# Patient Record
Sex: MALE | Race: WHITE | NOT HISPANIC OR LATINO | Employment: OTHER | URBAN - METROPOLITAN AREA
[De-identification: names, ages, dates, MRNs, and addresses within clinical notes are randomized per-mention and may not be internally consistent; named-entity substitution may affect disease eponyms.]

---

## 2018-05-31 ENCOUNTER — RECORDS - HEALTHEAST (OUTPATIENT)
Dept: LAB | Facility: CLINIC | Age: 68
End: 2018-05-31

## 2018-05-31 LAB
ALBUMIN SERPL-MCNC: 3.9 G/DL (ref 3.5–5)
ALP SERPL-CCNC: 52 U/L (ref 45–120)
ALT SERPL W P-5'-P-CCNC: 29 U/L (ref 0–45)
ANION GAP SERPL CALCULATED.3IONS-SCNC: 8 MMOL/L (ref 5–18)
AST SERPL W P-5'-P-CCNC: 25 U/L (ref 0–40)
BILIRUB SERPL-MCNC: 0.6 MG/DL (ref 0–1)
BUN SERPL-MCNC: 16 MG/DL (ref 8–22)
CALCIUM SERPL-MCNC: 9.3 MG/DL (ref 8.5–10.5)
CHLORIDE BLD-SCNC: 106 MMOL/L (ref 98–107)
CHOLEST SERPL-MCNC: 153 MG/DL
CO2 SERPL-SCNC: 27 MMOL/L (ref 22–31)
CREAT SERPL-MCNC: 0.77 MG/DL (ref 0.7–1.3)
FASTING STATUS PATIENT QL REPORTED: YES
GFR SERPL CREATININE-BSD FRML MDRD: >60 ML/MIN/1.73M2
GLUCOSE BLD-MCNC: 107 MG/DL (ref 70–125)
HDLC SERPL-MCNC: 45 MG/DL
LDLC SERPL CALC-MCNC: 97 MG/DL
POTASSIUM BLD-SCNC: 4.6 MMOL/L (ref 3.5–5)
PROT SERPL-MCNC: 6.4 G/DL (ref 6–8)
SODIUM SERPL-SCNC: 141 MMOL/L (ref 136–145)
TRIGL SERPL-MCNC: 57 MG/DL

## 2018-10-02 ENCOUNTER — RECORDS - HEALTHEAST (OUTPATIENT)
Dept: LAB | Facility: CLINIC | Age: 68
End: 2018-10-02

## 2018-10-03 LAB — B BURGDOR IGG+IGM SER QL: 0.09 INDEX VALUE

## 2020-02-25 ENCOUNTER — RECORDS - HEALTHEAST (OUTPATIENT)
Dept: LAB | Facility: CLINIC | Age: 70
End: 2020-02-25

## 2020-02-25 ENCOUNTER — RECORDS - HEALTHEAST (OUTPATIENT)
Dept: ADMINISTRATIVE | Facility: OTHER | Age: 70
End: 2020-02-25

## 2020-02-25 LAB
ALBUMIN SERPL-MCNC: 3.8 G/DL (ref 3.5–5)
ALP SERPL-CCNC: 60 U/L (ref 45–120)
ALT SERPL W P-5'-P-CCNC: 49 U/L (ref 0–45)
ANION GAP SERPL CALCULATED.3IONS-SCNC: 10 MMOL/L (ref 5–18)
AST SERPL W P-5'-P-CCNC: 26 U/L (ref 0–40)
BILIRUB SERPL-MCNC: 0.3 MG/DL (ref 0–1)
BUN SERPL-MCNC: 17 MG/DL (ref 8–28)
CALCIUM SERPL-MCNC: 9.2 MG/DL (ref 8.5–10.5)
CHLORIDE BLD-SCNC: 107 MMOL/L (ref 98–107)
CO2 SERPL-SCNC: 24 MMOL/L (ref 22–31)
CREAT SERPL-MCNC: 0.72 MG/DL (ref 0.7–1.3)
GFR SERPL CREATININE-BSD FRML MDRD: >60 ML/MIN/1.73M2
GLUCOSE BLD-MCNC: 96 MG/DL (ref 70–125)
POTASSIUM BLD-SCNC: 4.5 MMOL/L (ref 3.5–5)
PROT SERPL-MCNC: 6.5 G/DL (ref 6–8)
SODIUM SERPL-SCNC: 141 MMOL/L (ref 136–145)
TSH SERPL DL<=0.005 MIU/L-ACNC: 1.39 UIU/ML (ref 0.3–5)

## 2020-08-03 ENCOUNTER — RECORDS - HEALTHEAST (OUTPATIENT)
Dept: LAB | Facility: CLINIC | Age: 70
End: 2020-08-03

## 2020-08-04 LAB — B BURGDOR IGG+IGM SER QL: 0.06 INDEX VALUE

## 2021-01-07 ENCOUNTER — RECORDS - HEALTHEAST (OUTPATIENT)
Dept: LAB | Facility: CLINIC | Age: 71
End: 2021-01-07

## 2021-01-08 LAB
CHOLEST SERPL-MCNC: 163 MG/DL
FASTING STATUS PATIENT QL REPORTED: YES
HDLC SERPL-MCNC: 52 MG/DL
LDLC SERPL CALC-MCNC: 94 MG/DL
TRIGL SERPL-MCNC: 83 MG/DL

## 2021-01-25 ENCOUNTER — RECORDS - HEALTHEAST (OUTPATIENT)
Dept: LAB | Facility: CLINIC | Age: 71
End: 2021-01-25

## 2021-01-25 LAB
ALBUMIN SERPL-MCNC: 3.9 G/DL (ref 3.5–5)
ALP SERPL-CCNC: 52 U/L (ref 45–120)
ALT SERPL W P-5'-P-CCNC: 27 U/L (ref 0–45)
ANION GAP SERPL CALCULATED.3IONS-SCNC: 7 MMOL/L (ref 5–18)
AST SERPL W P-5'-P-CCNC: 24 U/L (ref 0–40)
BILIRUB SERPL-MCNC: 0.4 MG/DL (ref 0–1)
BUN SERPL-MCNC: 13 MG/DL (ref 8–28)
CALCIUM SERPL-MCNC: 9.1 MG/DL (ref 8.5–10.5)
CHLORIDE BLD-SCNC: 104 MMOL/L (ref 98–107)
CHOLEST SERPL-MCNC: 210 MG/DL
CO2 SERPL-SCNC: 27 MMOL/L (ref 22–31)
CREAT SERPL-MCNC: 0.73 MG/DL (ref 0.7–1.3)
FASTING STATUS PATIENT QL REPORTED: YES
GFR SERPL CREATININE-BSD FRML MDRD: >60 ML/MIN/1.73M2
GLUCOSE BLD-MCNC: 101 MG/DL (ref 70–125)
HDLC SERPL-MCNC: 44 MG/DL
LDLC SERPL CALC-MCNC: 145 MG/DL
POTASSIUM BLD-SCNC: 5 MMOL/L (ref 3.5–5)
PROT SERPL-MCNC: 6.3 G/DL (ref 6–8)
PSA SERPL-MCNC: 0.6 NG/ML (ref 0–6.5)
SODIUM SERPL-SCNC: 138 MMOL/L (ref 136–145)
TRIGL SERPL-MCNC: 107 MG/DL

## 2021-04-08 ENCOUNTER — RECORDS - HEALTHEAST (OUTPATIENT)
Dept: LAB | Facility: CLINIC | Age: 71
End: 2021-04-08

## 2021-04-08 LAB
ALBUMIN SERPL-MCNC: 4 G/DL (ref 3.5–5)
ALP SERPL-CCNC: 50 U/L (ref 45–120)
ALT SERPL W P-5'-P-CCNC: 29 U/L (ref 0–45)
AST SERPL W P-5'-P-CCNC: 24 U/L (ref 0–40)
BILIRUB DIRECT SERPL-MCNC: 0.2 MG/DL
BILIRUB SERPL-MCNC: 0.5 MG/DL (ref 0–1)
CHOLEST SERPL-MCNC: 146 MG/DL
FASTING STATUS PATIENT QL REPORTED: YES
HDLC SERPL-MCNC: 43 MG/DL
LDLC SERPL CALC-MCNC: 87 MG/DL
PROT SERPL-MCNC: 6.1 G/DL (ref 6–8)
TRIGL SERPL-MCNC: 80 MG/DL

## 2021-05-25 ENCOUNTER — RECORDS - HEALTHEAST (OUTPATIENT)
Dept: ADMINISTRATIVE | Facility: CLINIC | Age: 71
End: 2021-05-25

## 2021-05-27 ENCOUNTER — RECORDS - HEALTHEAST (OUTPATIENT)
Dept: ADMINISTRATIVE | Facility: CLINIC | Age: 71
End: 2021-05-27

## 2021-06-02 ENCOUNTER — RECORDS - HEALTHEAST (OUTPATIENT)
Dept: ADMINISTRATIVE | Facility: CLINIC | Age: 71
End: 2021-06-02

## 2021-06-05 ENCOUNTER — RECORDS - HEALTHEAST (OUTPATIENT)
Dept: NEUROLOGY | Facility: CLINIC | Age: 71
End: 2021-06-05

## 2021-06-05 DIAGNOSIS — G43.909 MIGRAINE: ICD-10-CM

## 2021-06-05 DIAGNOSIS — G35 MULTIPLE SCLEROSIS (H): ICD-10-CM

## 2021-11-05 ENCOUNTER — LAB REQUISITION (OUTPATIENT)
Dept: LAB | Facility: CLINIC | Age: 71
End: 2021-11-05

## 2021-11-05 DIAGNOSIS — E78.5 HYPERLIPIDEMIA, UNSPECIFIED: ICD-10-CM

## 2021-11-12 ENCOUNTER — LAB REQUISITION (OUTPATIENT)
Dept: LAB | Facility: CLINIC | Age: 71
End: 2021-11-12
Payer: COMMERCIAL

## 2021-11-12 DIAGNOSIS — E78.5 HYPERLIPIDEMIA, UNSPECIFIED: ICD-10-CM

## 2021-11-12 LAB
ALBUMIN SERPL-MCNC: 3.9 G/DL (ref 3.5–5)
ALP SERPL-CCNC: 58 U/L (ref 45–120)
ALT SERPL W P-5'-P-CCNC: 22 U/L (ref 0–45)
ANION GAP SERPL CALCULATED.3IONS-SCNC: 8 MMOL/L (ref 5–18)
AST SERPL W P-5'-P-CCNC: 19 U/L (ref 0–40)
BILIRUB SERPL-MCNC: 0.5 MG/DL (ref 0–1)
BUN SERPL-MCNC: 14 MG/DL (ref 8–28)
CALCIUM SERPL-MCNC: 9.1 MG/DL (ref 8.5–10.5)
CHLORIDE BLD-SCNC: 105 MMOL/L (ref 98–107)
CHOLEST SERPL-MCNC: 156 MG/DL
CO2 SERPL-SCNC: 26 MMOL/L (ref 22–31)
CREAT SERPL-MCNC: 0.75 MG/DL (ref 0.7–1.3)
FASTING STATUS PATIENT QL REPORTED: NORMAL
GFR SERPL CREATININE-BSD FRML MDRD: >90 ML/MIN/1.73M2
GLUCOSE BLD-MCNC: 103 MG/DL (ref 70–125)
HDLC SERPL-MCNC: 45 MG/DL
LDLC SERPL CALC-MCNC: 97 MG/DL
POTASSIUM BLD-SCNC: 4.2 MMOL/L (ref 3.5–5)
PROT SERPL-MCNC: 6.5 G/DL (ref 6–8)
SODIUM SERPL-SCNC: 139 MMOL/L (ref 136–145)
TRIGL SERPL-MCNC: 69 MG/DL

## 2021-11-12 PROCEDURE — 80061 LIPID PANEL: CPT | Mod: ORL | Performed by: FAMILY MEDICINE

## 2021-11-12 PROCEDURE — 80053 COMPREHEN METABOLIC PANEL: CPT | Mod: ORL | Performed by: FAMILY MEDICINE

## 2022-08-03 ENCOUNTER — TRANSFERRED RECORDS (OUTPATIENT)
Dept: HEALTH INFORMATION MANAGEMENT | Facility: CLINIC | Age: 72
End: 2022-08-03

## 2022-08-03 ENCOUNTER — LAB REQUISITION (OUTPATIENT)
Dept: LAB | Facility: CLINIC | Age: 72
End: 2022-08-03
Payer: COMMERCIAL

## 2022-08-03 DIAGNOSIS — R00.2 PALPITATIONS: ICD-10-CM

## 2022-08-03 LAB
ALBUMIN SERPL BCG-MCNC: 4.5 G/DL (ref 3.5–5.2)
ALP SERPL-CCNC: 59 U/L (ref 40–129)
ALT SERPL W P-5'-P-CCNC: 27 U/L (ref 10–50)
ANION GAP SERPL CALCULATED.3IONS-SCNC: 10 MMOL/L (ref 7–15)
AST SERPL W P-5'-P-CCNC: 24 U/L (ref 10–50)
BILIRUB SERPL-MCNC: 0.3 MG/DL
BUN SERPL-MCNC: 15.9 MG/DL (ref 8–23)
CALCIUM SERPL-MCNC: 9.4 MG/DL (ref 8.8–10.2)
CHLORIDE SERPL-SCNC: 103 MMOL/L (ref 98–107)
CREAT SERPL-MCNC: 0.74 MG/DL (ref 0.67–1.17)
DEPRECATED HCO3 PLAS-SCNC: 27 MMOL/L (ref 22–29)
GFR SERPL CREATININE-BSD FRML MDRD: >90 ML/MIN/1.73M2
GLUCOSE SERPL-MCNC: 107 MG/DL (ref 70–99)
POTASSIUM SERPL-SCNC: 4.4 MMOL/L (ref 3.4–5.3)
PROT SERPL-MCNC: 6.6 G/DL (ref 6.4–8.3)
SODIUM SERPL-SCNC: 140 MMOL/L (ref 136–145)
TSH SERPL DL<=0.005 MIU/L-ACNC: 2.1 UIU/ML (ref 0.3–4.2)

## 2022-08-03 PROCEDURE — 84443 ASSAY THYROID STIM HORMONE: CPT | Mod: ORL | Performed by: FAMILY MEDICINE

## 2022-08-03 PROCEDURE — 80053 COMPREHEN METABOLIC PANEL: CPT | Mod: ORL | Performed by: FAMILY MEDICINE

## 2022-08-11 ENCOUNTER — HOSPITAL ENCOUNTER (OUTPATIENT)
Dept: CARDIOLOGY | Facility: CLINIC | Age: 72
Discharge: HOME | End: 2022-08-11
Attending: FAMILY MEDICINE
Payer: COMMERCIAL

## 2022-08-11 DIAGNOSIS — R00.2 HEART PALPITATIONS: ICD-10-CM

## 2022-08-11 PROCEDURE — 93016 CV STRESS TEST SUPVJ ONLY: CPT | Performed by: INTERNAL MEDICINE

## 2022-08-11 PROCEDURE — 255N000002 HC RX 255 OP 636: Performed by: FAMILY MEDICINE

## 2022-08-11 PROCEDURE — 93325 DOPPLER ECHO COLOR FLOW MAPG: CPT | Mod: 26 | Performed by: INTERNAL MEDICINE

## 2022-08-11 PROCEDURE — 93352 ADMIN ECG CONTRAST AGENT: CPT | Performed by: INTERNAL MEDICINE

## 2022-08-11 PROCEDURE — 93226 XTRNL ECG REC<48 HR SCAN A/R: CPT

## 2022-08-11 PROCEDURE — 93321 DOPPLER ECHO F-UP/LMTD STD: CPT | Mod: TC

## 2022-08-11 PROCEDURE — C8928 TTE W OR W/O FOL W/CON,STRES: HCPCS

## 2022-08-11 PROCEDURE — 93018 CV STRESS TEST I&R ONLY: CPT | Performed by: INTERNAL MEDICINE

## 2022-08-11 PROCEDURE — 93325 DOPPLER ECHO COLOR FLOW MAPG: CPT | Mod: TC

## 2022-08-11 PROCEDURE — 93321 DOPPLER ECHO F-UP/LMTD STD: CPT | Mod: 26 | Performed by: INTERNAL MEDICINE

## 2022-08-11 PROCEDURE — 93350 STRESS TTE ONLY: CPT | Mod: 26 | Performed by: INTERNAL MEDICINE

## 2022-08-11 RX ADMIN — PERFLUTREN 5 ML: 6.52 INJECTION, SUSPENSION INTRAVENOUS at 13:45

## 2022-08-15 PROCEDURE — 93227 XTRNL ECG REC<48 HR R&I: CPT | Performed by: INTERNAL MEDICINE

## 2022-08-18 ENCOUNTER — MEDICAL CORRESPONDENCE (OUTPATIENT)
Dept: HEALTH INFORMATION MANAGEMENT | Facility: CLINIC | Age: 72
End: 2022-08-18

## 2022-08-31 ENCOUNTER — TELEPHONE (OUTPATIENT)
Dept: CARDIOLOGY | Facility: CLINIC | Age: 72
End: 2022-08-31

## 2022-08-31 ENCOUNTER — OFFICE VISIT (OUTPATIENT)
Dept: CARDIOLOGY | Facility: CLINIC | Age: 72
End: 2022-08-31
Payer: COMMERCIAL

## 2022-08-31 VITALS
DIASTOLIC BLOOD PRESSURE: 68 MMHG | RESPIRATION RATE: 16 BRPM | WEIGHT: 195 LBS | HEART RATE: 68 BPM | SYSTOLIC BLOOD PRESSURE: 90 MMHG | BODY MASS INDEX: 27.98 KG/M2

## 2022-08-31 DIAGNOSIS — I49.3 PVC'S (PREMATURE VENTRICULAR CONTRACTIONS): ICD-10-CM

## 2022-08-31 DIAGNOSIS — R00.2 HEART PALPITATIONS: ICD-10-CM

## 2022-08-31 DIAGNOSIS — R94.39 ABNORMAL CARDIOVASCULAR STRESS TEST: Primary | ICD-10-CM

## 2022-08-31 DIAGNOSIS — I25.10 CORONARY ARTERY DISEASE INVOLVING NATIVE CORONARY ARTERY OF NATIVE HEART WITHOUT ANGINA PECTORIS: Primary | ICD-10-CM

## 2022-08-31 DIAGNOSIS — R94.39 ABNORMAL CARDIOVASCULAR STRESS TEST: ICD-10-CM

## 2022-08-31 PROCEDURE — 99205 OFFICE O/P NEW HI 60 MIN: CPT | Performed by: INTERNAL MEDICINE

## 2022-08-31 RX ORDER — RIZATRIPTAN BENZOATE 10 MG/1
10 TABLET ORAL
COMMUNITY
End: 2024-06-19

## 2022-08-31 RX ORDER — ALBUTEROL SULFATE 90 UG/1
2 AEROSOL, METERED RESPIRATORY (INHALATION) EVERY 6 HOURS PRN
COMMUNITY
End: 2024-03-04

## 2022-08-31 NOTE — H&P (VIEW-ONLY)
[facility]  [phone]        Assessment/Recommendations   Patient with recent Holter monitor which was ordered because of recurrent palpitations.  He had 8% premature ventricular contractions on his Holter monitor but no ventricular dysrhythmias.  Stress echo showed new hypokinesis in the mid and anterior apical region of the left ventricle postexercise and the EKG showed ST changes suggestive of myocardial ischemia.  Patient has no symptoms of chest discomfort and does admit to being a bit more short of breath but relates that to some deconditioning as he has not been exercising.    We talked about strategies going forward and given his high level of PVCs, a history of 50% LAD stenosis many years ago on CT angiography and abnormal stress echocardiogram, I have recommended coronary angiography with the possibility of percutaneous intervention.  It is possible that this could reduce PVC burden and reduce his risk of PVC induced cardiomyopathy but this is not a certainty at all.  It is also possible that his shortness of breath and exercise tolerance could improve but again not a certainty.    Patient is comfortable going ahead with coronary angiography and I explained potential risks including stroke, myocardial infarction or death.    We will schedule in the near future.    His lipids have been treated with our atorvastatin for many years.  Would add a baby aspirin if patient not allergic.  60 minutes spent with chart review, patient visit, and documentation.     History of Present Illness/Subjective    Mr. Alex Jiang is a 72 year old male with known history of coronary artery disease.  In approximately 1995 he had a calcium score which is 4-600.  He recollects having a CT coronary angiogram shortly thereafter which showed a 50% LAD stenosis.  At that time he started on our atorvastatin high-dose but eventually weaned it down to 20 mg a day and reports that his LDL generally was right  around 100 or little last.  His HDL tended to run a little low but would stay around 40.  He has had episodes of palpitations in the past and a Holter monitor couple years ago was unremarkable.  He had a repeat Holter monitor because of recurrent palpitations and he had 8% PVCs, no runs of ventricular tachycardia, no atrial fibrillation.  Because of the frequent PVCs he had a stress echocardiogram which showed anterior apical ischemia with new wall motion normality or lack of improvement of this area post exercise.    Patient denies orthopnea, paroxysmal nocturnal dyspnea, peripheral edema, syncope or near syncopal episodes.  He has no history rheumatic fever, cerebrovascular accident or TIA he is not diabetic, has never smoked cigarettes, cholesterol as noted above.  Family history is not dramatic for premature coronary artery disease and has not been treated for hypertension.    Patient grew up in [facility].  He went to the [employer] medical [employer].  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  Patient is .    .       Physical Examination Review of Systems   BP 90/68 (BP Location: Left arm, Patient Position: Sitting, Cuff Size: Adult Regular)   Pulse 68   Resp 16   Wt 88.5 kg (195 lb)   BMI 27.98 kg/m    Body mass index is 27.98 kg/m .  Wt Readings from Last 3 Encounters:   08/31/22 88.5 kg (195 lb)     General Appearance:   Alert, cooperative and in no acute distress.   ENT/Mouth: Patient wearing a mask.      EYES:  no scleral icterus, normal conjunctivae   Neck: JVP normal. No Hepatojugular reflux. Thyroid not visualized.   Chest/Lungs:   Lungs are clear to auscultation, equal chest wall expansion.   Cardiovascular:   S1, S2 without murmur ,clicks or rubs.  Brachial, radial and posterior tibial pulses are intact and symetric. No carotid bruits noted   Abdomen:  Nontender.    Extremities: No cyanosis, clubbing or edema   Skin: no xanthelasma, warm.    Neurologic: normal arm movement bilateral, no tremors     Psychiatric: Appropriate affect.      Enc Vitals  BP: 90/68  Pulse: 68  Resp: 16  Weight: 88.5 kg (195 lb)                                           Medical History  Surgical History Family History Social History   No past medical history on file. No past surgical history on file. No family history on file. Social History     Socioeconomic History     Marital status: Single     Spouse name: Not on file     Number of children: Not on file     Years of education: Not on file     Highest education level: Not on file   Occupational History     Not on file   Tobacco Use     Smoking status: Former Smoker     Quit date:      Years since quittin.6     Smokeless tobacco: Never Used   Substance and Sexual Activity     Alcohol use: Not on file     Drug use: Not on file     Sexual activity: Not on file   Other Topics Concern     Not on file   Social History Narrative     Not on file     Social Determinants of Health     Financial Resource Strain: Not on file   Food Insecurity: Not on file   Transportation Needs: Not on file   Physical Activity: Not on file   Stress: Not on file   Social Connections: Not on file   Intimate Partner Violence: Not on file   Housing Stability: Not on file          Medications  Allergies   Current Outpatient Medications   Medication Sig Dispense Refill     albuterol (PROAIR HFA/PROVENTIL HFA/VENTOLIN HFA) 108 (90 Base) MCG/ACT inhaler Inhale 2 puffs into the lungs every 6 hours       loratadine (CLARITIN) 10 mg tablet [LORATADINE (CLARITIN) 10 MG TABLET] Take 10 mg by mouth daily.       lovastatin (MEVACOR) 20 MG tablet [LOVASTATIN (MEVACOR) 20 MG TABLET] Take 20 mg by mouth bedtime.       rizatriptan (MAXALT) 10 MG tablet Take 10 mg by  mouth at onset of headache for migraine       therapeutic multivitamin (THERAGRAN) tablet [THERAPEUTIC MULTIVITAMIN (THERAGRAN) TABLET] Take 1 tablet by mouth daily.      Allergies   Allergen Reactions     Cephalosporins Unknown     Penicillins Unknown     Sulfa (Sulfonamide Antibiotics) [Sulfa Drugs] Unknown         Lab Results    Chemistry/lipid CBC Cardiac Enzymes/BNP/TSH/INR   Lab Results   Component Value Date    CHOL 156 11/12/2021    HDL 45 11/12/2021    TRIG 69 11/12/2021    BUN 15.9 08/03/2022     08/03/2022    CO2 27 08/03/2022    No results found for: WBC, HGB, HCT, MCV, PLT Lab Results   Component Value Date    TSH 2.10 08/03/2022

## 2022-08-31 NOTE — PROGRESS NOTES
[facility]  [phone]        Assessment/Recommendations   Patient with recent Holter monitor which was ordered because of recurrent palpitations.  He had 8% premature ventricular contractions on his Holter monitor but no ventricular dysrhythmias.  Stress echo showed new hypokinesis in the mid and anterior apical region of the left ventricle postexercise and the EKG showed ST changes suggestive of myocardial ischemia.  Patient has no symptoms of chest discomfort and does admit to being a bit more short of breath but relates that to some deconditioning as he has not been exercising.    We talked about strategies going forward and given his high level of PVCs, a history of 50% LAD stenosis many years ago on CT angiography and abnormal stress echocardiogram, I have recommended coronary angiography with the possibility of percutaneous intervention.  It is possible that this could reduce PVC burden and reduce his risk of PVC induced cardiomyopathy but this is not a certainty at all.  It is also possible that his shortness of breath and exercise tolerance could improve but again not a certainty.    Patient is comfortable going ahead with coronary angiography and I explained potential risks including stroke, myocardial infarction or death.    We will schedule in the near future.    His lipids have been treated with our atorvastatin for many years.  Would add a baby aspirin if patient not allergic.  60 minutes spent with chart review, patient visit, and documentation.     History of Present Illness/Subjective    Mr. Alex Jiang is a 72 year old male with known history of coronary artery disease.  In approximately 1995 he had a calcium score which is 4-600.  He recollects having a CT coronary angiogram shortly thereafter which showed a 50% LAD stenosis.  At that time he started on our atorvastatin high-dose but eventually weaned it down to 20 mg a day and reports that his LDL generally was right  around 100 or little last.  His HDL tended to run a little low but would stay around 40.  He has had episodes of palpitations in the past and a Holter monitor couple years ago was unremarkable.  He had a repeat Holter monitor because of recurrent palpitations and he had 8% PVCs, no runs of ventricular tachycardia, no atrial fibrillation.  Because of the frequent PVCs he had a stress echocardiogram which showed anterior apical ischemia with new wall motion normality or lack of improvement of this area post exercise.    Patient denies orthopnea, paroxysmal nocturnal dyspnea, peripheral edema, syncope or near syncopal episodes.  He has no history rheumatic fever, cerebrovascular accident or TIA he is not diabetic, has never smoked cigarettes, cholesterol as noted above.  Family history is not dramatic for premature coronary artery disease and has not been treated for hypertension.    Patient grew up in [place] [place] [facility].  He went to the [employer] medical school.  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  Patient is .    .       Physical Examination Review of Systems   BP 90/68 (BP Location: Left arm, Patient Position: Sitting, Cuff Size: Adult Regular)   Pulse 68   Resp 16   Wt 88.5 kg (195 lb)   BMI 27.98 kg/m    Body mass index is 27.98 kg/m .  Wt Readings from Last 3 Encounters:   08/31/22 88.5 kg (195 lb)     General Appearance:   Alert, cooperative and in no acute distress.   ENT/Mouth: Patient wearing a mask.      EYES:  no scleral icterus, normal conjunctivae   Neck: JVP normal. No Hepatojugular reflux. Thyroid not visualized.   Chest/Lungs:   Lungs are clear to auscultation, equal chest wall expansion.   Cardiovascular:   S1, S2 without murmur ,clicks or rubs.  Brachial, radial and posterior tibial pulses are intact and symetric. No carotid bruits noted   Abdomen:  Nontender.    Extremities: No cyanosis, clubbing or edema   Skin: no xanthelasma, warm.    Neurologic: normal arm movement bilateral, no tremors     Psychiatric: Appropriate affect.      Enc Vitals  BP: 90/68  Pulse: 68  Resp: 16  Weight: 88.5 kg (195 lb)                                           Medical History  Surgical History Family History Social History   No past medical history on file. No past surgical history on file. No family history on file. Social History     Socioeconomic History     Marital status: Single     Spouse name: Not on file     Number of children: Not on file     Years of education: Not on file     Highest education level: Not on file   Occupational History     Not on file   Tobacco Use     Smoking status: Former Smoker     Quit date:      Years since quittin.6     Smokeless tobacco: Never Used   Substance and Sexual Activity     Alcohol use: Not on file     Drug use: Not on file     Sexual activity: Not on file   Other Topics Concern     Not on file   Social History Narrative     Not on file     Social Determinants of Health     Financial Resource Strain: Not on file   Food Insecurity: Not on file   Transportation Needs: Not on file   Physical Activity: Not on file   Stress: Not on file   Social Connections: Not on file   Intimate Partner Violence: Not on file   Housing Stability: Not on file          Medications  Allergies   Current Outpatient Medications   Medication Sig Dispense Refill     albuterol (PROAIR HFA/PROVENTIL HFA/VENTOLIN HFA) 108 (90 Base) MCG/ACT inhaler Inhale 2 puffs into the lungs every 6 hours       loratadine (CLARITIN) 10 mg tablet [LORATADINE (CLARITIN) 10 MG TABLET] Take 10 mg by mouth daily.       lovastatin (MEVACOR) 20 MG tablet [LOVASTATIN (MEVACOR) 20 MG TABLET] Take 20 mg by mouth bedtime.       rizatriptan (MAXALT) 10 MG tablet Take 10 mg by  mouth at onset of headache for migraine       therapeutic multivitamin (THERAGRAN) tablet [THERAPEUTIC MULTIVITAMIN (THERAGRAN) TABLET] Take 1 tablet by mouth daily.      Allergies   Allergen Reactions     Cephalosporins Unknown     Penicillins Unknown     Sulfa (Sulfonamide Antibiotics) [Sulfa Drugs] Unknown         Lab Results    Chemistry/lipid CBC Cardiac Enzymes/BNP/TSH/INR   Lab Results   Component Value Date    CHOL 156 11/12/2021    HDL 45 11/12/2021    TRIG 69 11/12/2021    BUN 15.9 08/03/2022     08/03/2022    CO2 27 08/03/2022    No results found for: WBC, HGB, HCT, MCV, PLT Lab Results   Component Value Date    TSH 2.10 08/03/2022

## 2022-08-31 NOTE — LETTER
8/31/2022    Richar Flor MD  [facility] [address] [place] [address]    RE: Alex Jiang       Dear Colleague,     I had the pleasure of seeing Alex Jiang in the [facility].      [facility]  [phone]        Assessment/Recommendations   Patient with recent Holter monitor which was ordered because of recurrent palpitations.  He had 8% premature ventricular contractions on his Holter monitor but no ventricular dysrhythmias.  Stress echo showed new hypokinesis in the mid and anterior apical region of the left ventricle postexercise and the EKG showed ST changes suggestive of myocardial ischemia.  Patient has no symptoms of chest discomfort and does admit to being a bit more short of breath but relates that to some deconditioning as he has not been exercising.    We talked about strategies going forward and given his high level of PVCs, a history of 50% LAD stenosis many years ago on CT angiography and abnormal stress echocardiogram, I have recommended coronary angiography with the possibility of percutaneous intervention.  It is possible that this could reduce PVC burden and reduce his risk of PVC induced cardiomyopathy but this is not a certainty at all.  It is also possible that his shortness of breath and exercise tolerance could improve but again not a certainty.    Patient is comfortable going ahead with coronary angiography and I explained potential risks including stroke, myocardial infarction or death.    We will schedule in the near future.    His lipids have been treated with our atorvastatin for many years.  Would add a baby aspirin if patient not allergic.  60 minutes spent with chart review, patient visit, and documentation.     History of Present Illness/Subjective    Mr. Alex Jiang is a 72 year old male with known history of coronary artery disease.  In approximately 1995 he had a calcium score which is 4-600.  He recollects having a CT  coronary angiogram shortly thereafter which showed a 50% LAD stenosis.  At that time he started on our atorvastatin high-dose but eventually weaned it down to 20 mg a day and reports that his LDL generally was right around 100 or little last.  His HDL tended to run a little low but would stay around 40.  He has had episodes of palpitations in the past and a Holter monitor couple years ago was unremarkable.  He had a repeat Holter monitor because of recurrent palpitations and he had 8% PVCs, no runs of ventricular tachycardia, no atrial fibrillation.  Because of the frequent PVCs he had a stress echocardiogram which showed anterior apical ischemia with new wall motion normality or lack of improvement of this area post exercise.    Patient denies orthopnea, paroxysmal nocturnal dyspnea, peripheral edema, syncope or near syncopal episodes.  He has no history rheumatic fever, cerebrovascular accident or TIA he is not diabetic, has never smoked cigarettes, cholesterol as noted above.  Family history is not dramatic for premature coronary artery disease and has not been treated for hypertension.    Patient grew up in [facility].  He went to the [employer] medical [employer].  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  Patient is .    .       Physical Examination Review of Systems   BP 90/68 (BP Location: Left arm, Patient Position: Sitting, Cuff Size: Adult Regular)   Pulse 68   Resp 16   Wt 88.5 kg (195 lb)   BMI 27.98 kg/m    Body mass index is 27.98 kg/m .  Wt Readings from Last 3 Encounters:   08/31/22 88.5 kg (195 lb)     General Appearance:   Alert, cooperative and in no acute distress.   ENT/Mouth: Patient wearing a mask.      EYES:  no scleral icterus, normal  conjunctivae   Neck: JVP normal. No Hepatojugular reflux. Thyroid not visualized.   Chest/Lungs:   Lungs are clear to auscultation, equal chest wall expansion.   Cardiovascular:   S1, S2 without murmur ,clicks or rubs. Brachial, radial and posterior tibial pulses are intact and symetric. No carotid bruits noted   Abdomen:  Nontender.    Extremities: No cyanosis, clubbing or edema   Skin: no xanthelasma, warm.    Neurologic: normal arm movement bilateral, no tremors     Psychiatric: Appropriate affect.      Enc Vitals  BP: 90/68  Pulse: 68  Resp: 16  Weight: 88.5 kg (195 lb)                                           Medical History  Surgical History Family History Social History   No past medical history on file. No past surgical history on file. No family history on file. Social History     Socioeconomic History     Marital status: Single     Spouse name: Not on file     Number of children: Not on file     Years of education: Not on file     Highest education level: Not on file   Occupational History     Not on file   Tobacco Use     Smoking status: Former Smoker     Quit date:      Years since quittin.6     Smokeless tobacco: Never Used   Substance and Sexual Activity     Alcohol use: Not on file     Drug use: Not on file     Sexual activity: Not on file   Other Topics Concern     Not on file   Social History Narrative     Not on file     Social Determinants of Health     Financial Resource Strain: Not on file   Food Insecurity: Not on file   Transportation Needs: Not on file   Physical Activity: Not on file   Stress: Not on file   Social Connections: Not on file   Intimate Partner Violence: Not on file   Housing Stability: Not on file          Medications  Allergies   Current Outpatient Medications   Medication Sig Dispense Refill     albuterol (PROAIR HFA/PROVENTIL HFA/VENTOLIN HFA) 108 (90 Base) MCG/ACT inhaler Inhale 2 puffs into the lungs every 6 hours       loratadine (CLARITIN) 10 mg tablet  [LORATADINE (CLARITIN) 10 MG TABLET] Take 10 mg by mouth daily.       lovastatin (MEVACOR) 20 MG tablet [LOVASTATIN (MEVACOR) 20 MG TABLET] Take 20 mg by mouth bedtime.       rizatriptan (MAXALT) 10 MG tablet Take 10 mg by mouth at onset of headache for migraine       therapeutic multivitamin (THERAGRAN) tablet [THERAPEUTIC MULTIVITAMIN (THERAGRAN) TABLET] Take 1 tablet by mouth daily.      Allergies   Allergen Reactions     Cephalosporins Unknown     Penicillins Unknown     Sulfa (Sulfonamide Antibiotics) [Sulfa Drugs] Unknown         Lab Results    Chemistry/lipid CBC Cardiac Enzymes/BNP/TSH/INR   Lab Results   Component Value Date    CHOL 156 11/12/2021    HDL 45 11/12/2021    TRIG 69 11/12/2021    BUN 15.9 08/03/2022     08/03/2022    CO2 27 08/03/2022    No results found for: WBC, HGB, HCT, MCV, PLT Lab Results   Component Value Date    TSH 2.10 08/03/2022                Thank you for allowing me to participate in the care of your patient.      Sincerely,     Booker Leon MD     [facility]  cc:   Referred Self,

## 2022-09-01 NOTE — TELEPHONE ENCOUNTER
Verbal discussion with J and regards to Maxalt for migraine treatment prior to procedure. THJ states after review to not take any Maxalt morning of procedure as it does have vasoconstriction properties. Once patient arranged will follow-up discuss. TarikRn

## 2022-09-02 NOTE — TELEPHONE ENCOUNTER
===View-only below this line===  ----- Message -----  From: Bj Zuluaga  Sent: 9/2/2022  10:40 AM CDT  To: Johan Watson RN  Subject: RE: [facility] patient to schedule for coronary ang*    Case type: CA POSS PCI    Procedure Physician(s): PTK    Procedure Date and Patient Arrival Time: 9/7 9:30AM ARRIVAL    H&P: 8/31 THJ    Alerts: NONE     COVID Testing: AT HOME    THANK YOU,  CRISELDA      Pt has prep letter. Will call to complete education on 9/6/22. ANDREASRn

## 2022-09-06 ENCOUNTER — PREP FOR PROCEDURE (OUTPATIENT)
Dept: CARDIOLOGY | Facility: CLINIC | Age: 72
End: 2022-09-06

## 2022-09-06 DIAGNOSIS — I49.3 PVC'S (PREMATURE VENTRICULAR CONTRACTIONS): ICD-10-CM

## 2022-09-06 DIAGNOSIS — I25.10 CORONARY ARTERY DISEASE INVOLVING NATIVE CORONARY ARTERY OF NATIVE HEART WITHOUT ANGINA PECTORIS: ICD-10-CM

## 2022-09-06 DIAGNOSIS — R94.39 ABNORMAL CARDIOVASCULAR STRESS TEST: ICD-10-CM

## 2022-09-06 DIAGNOSIS — R00.2 HEART PALPITATIONS: Primary | ICD-10-CM

## 2022-09-06 RX ORDER — SODIUM CHLORIDE 9 MG/ML
INJECTION, SOLUTION INTRAVENOUS CONTINUOUS
Status: CANCELLED | OUTPATIENT
Start: 2022-09-07

## 2022-09-06 RX ORDER — FENTANYL CITRATE 50 UG/ML
25 INJECTION, SOLUTION INTRAMUSCULAR; INTRAVENOUS
Status: CANCELLED | OUTPATIENT
Start: 2022-09-07

## 2022-09-06 RX ORDER — ASPIRIN 81 MG/1
243 TABLET, CHEWABLE ORAL ONCE
Status: CANCELLED | OUTPATIENT
Start: 2022-09-07

## 2022-09-06 RX ORDER — LIDOCAINE 40 MG/G
CREAM TOPICAL
Status: CANCELLED | OUTPATIENT
Start: 2022-09-06

## 2022-09-06 RX ORDER — ASPIRIN 325 MG
325 TABLET ORAL ONCE
Status: CANCELLED | OUTPATIENT
Start: 2022-09-07 | End: 2022-09-06

## 2022-09-06 RX ORDER — LOVASTATIN 20 MG
20 TABLET ORAL AT BEDTIME
Qty: 90 TABLET | Refills: 3 | Status: SHIPPED | OUTPATIENT
Start: 2022-09-06 | End: 2022-09-06

## 2022-09-06 RX ORDER — DIAZEPAM 5 MG
5 TABLET ORAL
Status: CANCELLED | OUTPATIENT
Start: 2022-09-07

## 2022-09-06 NOTE — TELEPHONE ENCOUNTER
Alex Jiang  [address] [facility] [address]  [phone] (home)     PCP:  Richar Flor  H&VALERIE completed by:  8/31/2022 Dr. Leon  Admit date 9/7/2022 Arrival time:  09:30  Anticoagulation:  NA  Previous PCI: No  Bypass Grafts: No  Renal Issues: No  Diabetic?: No  Device?: No   Ambulation status: independent    Reason for Visit:  Telephone call to discuss pre-procedure education in preparation for: Coronary Angiogram with Possible PCI    Procedure Prep:  EKG results obtained, dated: To be completed on day of cath lab procedure  Hemogram results obtained: To be completed on day of cath lab procedure  Basic Metabolic Panel results obtained: To be completed on day of cath lab procedure  Pertinent cardiac test results: Stress Echo 8/11/2022, Holter Monitor 8/11/2022  COVID-19 test results obtained: At home     Patient Education    1. Your arrival time is 09:30.  [place] [address] - Main Entrance of the Hospital  2. Please plan on being at the hospital all day.  3. At any time, emergencies and/or urgent cases may come up which could delay the start of your procedure.    COVID Testing Instructions  *Mandatory COVID Testing:   ALL Patients will need to complete a COVID test no sooner than 4 days prior to their procedure (regardless of vaccination status).      To schedule COVID testing Please call [phone]    If you want to complete this at an outside facility please call them to find out if they will have the results within the appropriate time frame and their fax number.  You will need to provide us with that information so we can send the order.    The facility completing the test will need to fax the results to [phone]    If you are running into and issues please call us.     Pre-procedure instructions  Take your temperature in the morning prior to coming in.  If your temperature is 100 F please call  [facility] [phone] and  notify them.  If you do not have access to a thermometer at home, please come in for testing.  If you are running a temperature your procedure may be rescheduled.  Patient instructed to not Eat or Drink after midnight.  Patient instructed to shower the evening before or the morning of the procedure.  Patient instructed to arrange for transportation home following procedure from a responsible family member or friend. No driving for at least 24 hours.  Patient instructed to have a responsible adult with them for 24 hours post-procedure.  Post-procedure follow up process.  Conscious sedation discussed.      Pre-procedure medication instructions.  Continue medications as scheduled, with a small amount of water on the day of the procedure unless indicated. (NO Diabetic Medications or Blood Thinners)  Pt instructed not to consume Alcohol, Tobacco, Caffeine, or Carbonated beverages 12 hours prior to procedure.  Patient instructed to take 325 mg of Aspirin the night before and morning of procedure: Yes  Other medication: instructed to only take gabapentin, loratadine a.m. of the procedure.    Patient's wife will be the  the day of  The procedure and will stay with him for 24 hours after.             Patient states understanding of procedure and agrees to proceed.    *PATIENTS RECORDS AVAILABLE IN [facility] [facility] UNLESS OTHERWISE INDICATED*      There is no problem list on file for this patient.      Current Outpatient Medications   Medication Sig Dispense Refill     albuterol (PROAIR HFA/PROVENTIL HFA/VENTOLIN HFA) 108 (90 Base) MCG/ACT inhaler Inhale 2 puffs into the lungs every 6 hours       loratadine (CLARITIN) 10 mg tablet [LORATADINE (CLARITIN) 10 MG TABLET] Take 10 mg by mouth daily.       lovastatin (MEVACOR) 20 MG tablet [LOVASTATIN (MEVACOR) 20 MG TABLET] Take 20 mg by mouth bedtime.       rizatriptan (MAXALT) 10 MG tablet Take 10 mg by mouth at onset of headache for migraine       therapeutic multivitamin (THERAGRAN)  tablet [THERAPEUTIC MULTIVITAMIN (THERAGRAN) TABLET] Take 1 tablet by mouth daily.         Allergies   Allergen Reactions     Cephalosporins Unknown     Penicillins Unknown     Sulfa (Sulfonamide Antibiotics) [Sulfa Drugs] Unknown       JO ANN ABBOTT RN on 9/6/2022 at 11:15 AM

## 2022-09-07 ENCOUNTER — RESEARCH ENCOUNTER (OUTPATIENT)
Dept: CARDIOLOGY | Facility: CLINIC | Age: 72
End: 2022-09-07

## 2022-09-07 ENCOUNTER — HOSPITAL ENCOUNTER (OUTPATIENT)
Facility: HOSPITAL | Age: 72
Discharge: HOME | End: 2022-09-07
Attending: INTERNAL MEDICINE | Admitting: INTERNAL MEDICINE
Payer: COMMERCIAL

## 2022-09-07 VITALS
DIASTOLIC BLOOD PRESSURE: 60 MMHG | HEIGHT: 70 IN | HEART RATE: 74 BPM | WEIGHT: 189 LBS | SYSTOLIC BLOOD PRESSURE: 95 MMHG | TEMPERATURE: 97.6 F | BODY MASS INDEX: 27.06 KG/M2 | RESPIRATION RATE: 21 BRPM | OXYGEN SATURATION: 97 %

## 2022-09-07 DIAGNOSIS — R94.39 ABNORMAL CARDIOVASCULAR STRESS TEST: ICD-10-CM

## 2022-09-07 DIAGNOSIS — I49.3 PVC'S (PREMATURE VENTRICULAR CONTRACTIONS): ICD-10-CM

## 2022-09-07 DIAGNOSIS — I25.10 CORONARY ARTERY DISEASE INVOLVING NATIVE CORONARY ARTERY OF NATIVE HEART WITHOUT ANGINA PECTORIS: ICD-10-CM

## 2022-09-07 DIAGNOSIS — R00.2 HEART PALPITATIONS: ICD-10-CM

## 2022-09-07 LAB
ABO/RH(D): NORMAL
ANION GAP SERPL CALCULATED.3IONS-SCNC: 8 MMOL/L (ref 5–18)
ANTIBODY SCREEN: NEGATIVE
ATRIAL RATE - MUSE: 64 BPM
BUN SERPL-MCNC: 17 MG/DL (ref 8–28)
CALCIUM SERPL-MCNC: 9 MG/DL (ref 8.5–10.5)
CHLORIDE BLD-SCNC: 105 MMOL/L (ref 98–107)
CO2 SERPL-SCNC: 25 MMOL/L (ref 22–31)
CREAT SERPL-MCNC: 0.78 MG/DL (ref 0.7–1.3)
DIASTOLIC BLOOD PRESSURE - MUSE: NORMAL MMHG
ERYTHROCYTE [DISTWIDTH] IN BLOOD BY AUTOMATED COUNT: 12.8 % (ref 10–15)
GFR SERPL CREATININE-BSD FRML MDRD: >90 ML/MIN/1.73M2
GLUCOSE BLD-MCNC: 103 MG/DL (ref 70–125)
HCT VFR BLD AUTO: 48.2 % (ref 40–53)
HGB BLD-MCNC: 16.2 G/DL (ref 13.3–17.7)
INTERPRETATION ECG - MUSE: NORMAL
MCH RBC QN AUTO: 30.7 PG (ref 26.5–33)
MCHC RBC AUTO-ENTMCNC: 33.6 G/DL (ref 31.5–36.5)
MCV RBC AUTO: 91 FL (ref 78–100)
P AXIS - MUSE: 42 DEGREES
PLATELET # BLD AUTO: 224 10E3/UL (ref 150–450)
POTASSIUM BLD-SCNC: 3.9 MMOL/L (ref 3.5–5)
PR INTERVAL - MUSE: 212 MS
QRS DURATION - MUSE: 90 MS
QT - MUSE: 398 MS
QTC - MUSE: 410 MS
R AXIS - MUSE: 27 DEGREES
RBC # BLD AUTO: 5.28 10E6/UL (ref 4.4–5.9)
SODIUM SERPL-SCNC: 138 MMOL/L (ref 136–145)
SPECIMEN EXPIRATION DATE: NORMAL
SYSTOLIC BLOOD PRESSURE - MUSE: NORMAL MMHG
T AXIS - MUSE: 30 DEGREES
VENTRICULAR RATE- MUSE: 64 BPM
WBC # BLD AUTO: 6.4 10E3/UL (ref 4–11)

## 2022-09-07 PROCEDURE — C1725 CATH, TRANSLUMIN NON-LASER: HCPCS | Performed by: INTERNAL MEDICINE

## 2022-09-07 PROCEDURE — 250N000013 HC RX MED GY IP 250 OP 250 PS 637: Performed by: INTERNAL MEDICINE

## 2022-09-07 PROCEDURE — 250N000009 HC RX 250: Performed by: INTERNAL MEDICINE

## 2022-09-07 PROCEDURE — 99152 MOD SED SAME PHYS/QHP 5/>YRS: CPT | Performed by: INTERNAL MEDICINE

## 2022-09-07 PROCEDURE — 86901 BLOOD TYPING SEROLOGIC RH(D): CPT | Performed by: INTERNAL MEDICINE

## 2022-09-07 PROCEDURE — 85014 HEMATOCRIT: CPT | Performed by: INTERNAL MEDICINE

## 2022-09-07 PROCEDURE — 36415 COLL VENOUS BLD VENIPUNCTURE: CPT | Performed by: INTERNAL MEDICINE

## 2022-09-07 PROCEDURE — 999N000054 HC STATISTIC EKG NON-CHARGEABLE

## 2022-09-07 PROCEDURE — 80048 BASIC METABOLIC PNL TOTAL CA: CPT | Performed by: INTERNAL MEDICINE

## 2022-09-07 PROCEDURE — 258N000003 HC RX IP 258 OP 636: Performed by: INTERNAL MEDICINE

## 2022-09-07 PROCEDURE — 93454 CORONARY ARTERY ANGIO S&I: CPT | Performed by: INTERNAL MEDICINE

## 2022-09-07 PROCEDURE — 250N000011 HC RX IP 250 OP 636: Performed by: INTERNAL MEDICINE

## 2022-09-07 PROCEDURE — 93005 ELECTROCARDIOGRAM TRACING: CPT

## 2022-09-07 PROCEDURE — 93454 CORONARY ARTERY ANGIO S&I: CPT | Mod: 26 | Performed by: INTERNAL MEDICINE

## 2022-09-07 PROCEDURE — 93010 ELECTROCARDIOGRAM REPORT: CPT | Performed by: INTERNAL MEDICINE

## 2022-09-07 PROCEDURE — C1769 GUIDE WIRE: HCPCS | Performed by: INTERNAL MEDICINE

## 2022-09-07 PROCEDURE — 86850 RBC ANTIBODY SCREEN: CPT | Performed by: INTERNAL MEDICINE

## 2022-09-07 PROCEDURE — 255N000002 HC RX 255 OP 636: Performed by: INTERNAL MEDICINE

## 2022-09-07 PROCEDURE — C1894 INTRO/SHEATH, NON-LASER: HCPCS | Performed by: INTERNAL MEDICINE

## 2022-09-07 PROCEDURE — 272N000001 HC OR GENERAL SUPPLY STERILE: Performed by: INTERNAL MEDICINE

## 2022-09-07 RX ORDER — FENTANYL CITRATE 50 UG/ML
INJECTION, SOLUTION INTRAMUSCULAR; INTRAVENOUS
Status: DISCONTINUED | OUTPATIENT
Start: 2022-09-07 | End: 2022-09-07 | Stop reason: HOSPADM

## 2022-09-07 RX ORDER — NALOXONE HYDROCHLORIDE 0.4 MG/ML
0.2 INJECTION, SOLUTION INTRAMUSCULAR; INTRAVENOUS; SUBCUTANEOUS
Status: DISCONTINUED | OUTPATIENT
Start: 2022-09-07 | End: 2022-09-07 | Stop reason: HOSPADM

## 2022-09-07 RX ORDER — ATROPINE SULFATE 0.1 MG/ML
0.5 INJECTION INTRAVENOUS
Status: DISCONTINUED | OUTPATIENT
Start: 2022-09-07 | End: 2022-09-07 | Stop reason: HOSPADM

## 2022-09-07 RX ORDER — FENTANYL CITRATE 50 UG/ML
25 INJECTION, SOLUTION INTRAMUSCULAR; INTRAVENOUS
Status: DISCONTINUED | OUTPATIENT
Start: 2022-09-07 | End: 2022-09-07 | Stop reason: HOSPADM

## 2022-09-07 RX ORDER — NALOXONE HYDROCHLORIDE 0.4 MG/ML
0.4 INJECTION, SOLUTION INTRAMUSCULAR; INTRAVENOUS; SUBCUTANEOUS
Status: DISCONTINUED | OUTPATIENT
Start: 2022-09-07 | End: 2022-09-07 | Stop reason: HOSPADM

## 2022-09-07 RX ORDER — SODIUM CHLORIDE 9 MG/ML
INJECTION, SOLUTION INTRAVENOUS CONTINUOUS
Status: DISCONTINUED | OUTPATIENT
Start: 2022-09-07 | End: 2022-09-07 | Stop reason: HOSPADM

## 2022-09-07 RX ORDER — LIDOCAINE 40 MG/G
CREAM TOPICAL
Status: DISCONTINUED | OUTPATIENT
Start: 2022-09-07 | End: 2022-09-07 | Stop reason: HOSPADM

## 2022-09-07 RX ORDER — IODIXANOL 320 MG/ML
INJECTION, SOLUTION INTRAVASCULAR
Status: DISCONTINUED | OUTPATIENT
Start: 2022-09-07 | End: 2022-09-07 | Stop reason: HOSPADM

## 2022-09-07 RX ORDER — DIAZEPAM 5 MG
5 TABLET ORAL
Status: COMPLETED | OUTPATIENT
Start: 2022-09-07 | End: 2022-09-07

## 2022-09-07 RX ORDER — ASPIRIN 81 MG/1
243 TABLET, CHEWABLE ORAL ONCE
Status: DISCONTINUED | OUTPATIENT
Start: 2022-09-07 | End: 2022-09-07 | Stop reason: HOSPADM

## 2022-09-07 RX ORDER — OXYCODONE HYDROCHLORIDE 5 MG/1
5 TABLET ORAL EVERY 4 HOURS PRN
Status: DISCONTINUED | OUTPATIENT
Start: 2022-09-07 | End: 2022-09-07 | Stop reason: HOSPADM

## 2022-09-07 RX ORDER — OXYCODONE HYDROCHLORIDE 5 MG/1
10 TABLET ORAL EVERY 4 HOURS PRN
Status: DISCONTINUED | OUTPATIENT
Start: 2022-09-07 | End: 2022-09-07 | Stop reason: HOSPADM

## 2022-09-07 RX ORDER — ACETAMINOPHEN 325 MG/1
650 TABLET ORAL EVERY 4 HOURS PRN
Status: DISCONTINUED | OUTPATIENT
Start: 2022-09-07 | End: 2022-09-07 | Stop reason: HOSPADM

## 2022-09-07 RX ORDER — FLUMAZENIL 0.1 MG/ML
0.2 INJECTION, SOLUTION INTRAVENOUS
Status: DISCONTINUED | OUTPATIENT
Start: 2022-09-07 | End: 2022-09-07 | Stop reason: HOSPADM

## 2022-09-07 RX ORDER — ASPIRIN 325 MG
325 TABLET ORAL ONCE
Status: DISCONTINUED | OUTPATIENT
Start: 2022-09-07 | End: 2022-09-07 | Stop reason: HOSPADM

## 2022-09-07 RX ADMIN — SODIUM CHLORIDE: 9 INJECTION, SOLUTION INTRAVENOUS at 10:11

## 2022-09-07 RX ADMIN — DIAZEPAM 5 MG: 5 TABLET ORAL at 10:24

## 2022-09-07 ASSESSMENT — ACTIVITIES OF DAILY LIVING (ADL)
ADLS_ACUITY_SCORE: 35
ADLS_ACUITY_SCORE: 35

## 2022-09-07 NOTE — INTERVAL H&P NOTE
"I have reviewed the surgical (or preoperative) H&P that is linked to this encounter, and examined the patient. There are no significant changes    Clinical Conditions Present on Arrival:  Clinically Significant Risk Factors Present on Admission                   # Overweight: Estimated body mass index is 27.12 kg/m  as calculated from the following:    Height as of this encounter: 1.778 m (5' 10\").    Weight as of this encounter: 85.7 kg (189 lb).       "

## 2022-09-07 NOTE — PROGRESS NOTES
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 The consent form was reviewed with the patient.    The consent discussion included:    Study description and purpose     Qualifications for participation    Study procedures    Length of study    Device description     Risks of participation    Benefits (if any)    Alternatives    Voluntary participation    Confidentiality     Compensation/costs of participation    Injury and legal rights    The subject was provided time to review the consent form and consider participation. his questions were answered to his satisfaction. The patient has voluntarily agreed to participate in the above noted study.     The consent form version [id number] and HIPPA form version [id number] was signed 09/07/22.    The subject was provided with a copy of the consent form and HIPPA.    Janet Melgar

## 2022-09-07 NOTE — PRE-PROCEDURE
GENERAL PRE-PROCEDURE:   Procedure:  Coronary angiogram with possible PCI  Date/Time:  9/7/2022 9:44 AM    Written consent obtained?: Yes    Risks and benefits: Risks, benefits and alternatives were discussed    Consent given by:  Patient  Patient states understanding of procedure being performed: Yes    Patient's understanding of procedure matches consent: Yes    Procedure consent matches procedure scheduled: Yes    Expected level of sedation:  Moderate  Appropriately NPO:  Yes  ASA Class:  3 (palpitations, PVCs, CAD on CT)  Mallampati  :  Grade 2- soft palate, base of uvula, tonsillar pillars, and portion of posterior pharyngeal wall visible  Lungs:  Lungs clear with good breath sounds bilaterally  Heart:  Normal heart sounds and rate  History & Physical reviewed:  History and physical reviewed and no updates needed  Statement of review:  I have reviewed the lab findings, diagnostic data, medications, and the plan for sedation

## 2022-09-07 NOTE — PROGRESS NOTES
Discharge Summary - [facility]    Primary Care Physician:  Richar Flor    Discharge Provider: Santosh Dougherty MD     Admission Date: 2022.   Admission Diagnoses: No past medical history on file.  Discharge Date and Time: No discharge date for patient encounter.   Disposition: Home  Condition at Discharge: Stable  Code Status: No Order      Discharge Diagnoses:  CAD    Consult/s: None  Significant Diagnostic Studies: Angiogram: see report.  Surgery: None  Treatments: See medication discharge sheet.    Discharge Medications: See discharge med list    Follow up appointment with Primary Care Physician: Richar Flor  Follow up appointment with Specialist: PRISCILLA Leon MD    Diet: cardiac  Activity: Restricted as per instructions on sheath site care policy.  Restrictions: As per post heart cath protocol.  Wound / drain care: Routine wound care instructions.  Hospital Summary:   Outpatient angiogram to assess abnormal stress study.  Right radial access without complication. Study finds mild grade atherosclerotic changes but no severe or obstructive lesions.  See report for details. Under observation with anticipated discharge home later today.  No changes in medical therapy at present.    Vital Signs in last 24 hours:    Temp:  [97.6  F (36.4  C)-98  F (36.7  C)] 97.6  F (36.4  C)  Pulse:  [56-75] 68  Resp:  [9-18] 11  BP: ()/(56-88) 105/57  SpO2:  [94 %-100 %] 96 %    Physical Exam:    Pertinent exam findings on day of discharge include sheath site stable at discharge.       Alex EVA García 1950, MRN [id number]          Current Facility-Administered Medications:      0.9% sodium chloride BOLUS, 250 mL, Intravenous, Once PRN, Santosh Dougherty MD     acetaminophen (TYLENOL) tablet 650 mg, 650 mg, Oral, Q4H PRN, Santosh Dougherty MD     atropine injection 0.5 mg, 0.5 mg, Intravenous, Once PRN, Santosh Dougherty MD     fentaNYL (PF) (SUBLIMAZE) injection 25 mcg, 25  mcg, Intravenous, Q15 Min PRN, Santosh Dougherty MD     flumazenil (ROMAZICON) injection 0.2 mg, 0.2 mg, Intravenous, q1 min prn, Santosh Dougherty MD     Hold: metformin and metformin containing medications on day of the procedure and for 48 hours after IV contrast given- Patients with acute kidney injury or severe chronic kidney disease (stage IV or stage V; i.e., eGFR less than 30), , Does not apply, HOLD, Santosh Dougherty MD     lidocaine (LMX4) cream, , Topical, Q1H PRN, Santosh Dougherty MD     lidocaine 1 % 0.1-1 mL, 0.1-1 mL, Other, Q1H PRN, Santosh Dougherty MD     midazolam (VERSED) injection 0.5 mg, 0.5 mg, Intravenous, Q5 Min PRN, Santosh Dougherty MD     naloxone (NARCAN) injection 0.2 mg, 0.2 mg, Intravenous, Q2 Min PRN **OR** naloxone (NARCAN) injection 0.4 mg, 0.4 mg, Intravenous, Q2 Min PRN **OR** naloxone (NARCAN) injection 0.2 mg, 0.2 mg, Intramuscular, Q2 Min PRN **OR** naloxone (NARCAN) injection 0.4 mg, 0.4 mg, Intramuscular, Q2 Min PRN, Santosh Dougherty MD     oxyCODONE (ROXICODONE) tablet 5 mg, 5 mg, Oral, Q4H PRN **OR** oxyCODONE (ROXICODONE) tablet 10 mg, 10 mg, Oral, Q4H PRN, Santosh Dougherty MD     sodium chloride (PF) 0.9% PF flush 3 mL, 3 mL, Intracatheter, Q8H, Santosh Dougherty MD     sodium chloride (PF) 0.9% PF flush 3 mL, 3 mL, Intracatheter, q1 min prn, Santosh Dougherty MD

## 2022-09-07 NOTE — DISCHARGE INSTRUCTIONS

## 2022-09-07 NOTE — PLAN OF CARE
Goal Outcome Evaluation:           Discharge instructions given to pt and wife Renée. Pt understands restrictions and what to do if hematoma or bleeding occurs.     Pt up ambulated in  vazquez and voided in bathroom. Pt getting dressed and will discharge pt to wife.    Naima Burnett RN

## 2022-09-07 NOTE — PROGRESS NOTES
A Study to EXhibit Percutaneous coronary Artery  dilatation with Non-Slip Element balloon  (EXPANSE-PTCA)  Protocol Revision B  PI: Dr. Britt    General Inclusion Criteria: All must be YES   General Inclusion Criteria    1 Age 18 years or older.    Yes   2 Willing to provide written informed consent and written Health Insurance Portability and Accountability Act (HIPAA) authorization prior to initiation of study-related procedures.    Yes   3 Agree to not participate in any other clinical study, during hospitalization for the index procedure, that would interfere with the endpoints of this study.    Yes   4 Clinical evidence of ischemic heart disease, stable/unstable angina, or silent ischemia.    Yes   5 Acceptable candidate for PCI and emergency coronary artery bypass grafting (CABG) and is planned for possible PTCA and/or stent placement.    Yes   Angiographic Inclusion Criteria: All must be YES   Angiographic Inclusion Criteria    6  De gretel or restenotic lesion(s) in native coronary arteries, including in-stent restenosis    No   7 A maximum of two lesions, including at least one target lesion, in single or double vessel coronary artery disease.     a. If two target lesions are defined, then no non-target lesions can be treated.     b. If a single target lesion is defined, then a single non-target lesion may be treated, but if so, it must be located in a different coronary artery from the target lesion.    No   8 Target lesion(s) must have a reference vessel diameter (RVD) between 2.0 mm and 4.0 mm by visual estimation.    No   9 Target lesion(s) must have a diameter stenosis of (a) ?70% by visual estimation or (b) >50% by visual estimation and a fractional flow reserve (FFR) or <0.80 or resting full-cycle rato (RFR) or instantaneous free-wave ratio (iFR) <0.9.    No   10 Treatment of non-target lesion, if any, must be completed prior to treatment of target lesion; must not, in the opinion of the  investigator, impact the conduct or completion of the index procedure; and must be deemed a clinical angiographic success as visually assessed by the investigator.    Yes     General Exclusion Criteria: All must be NO   General Exclusion criteria    1 Known hypersensitivity or contraindication to aspirin, heparin, bivalirudin, anti-platelet medications, a clopidogrel non-responder, or sensitivity to contrast media that cannot be adequately pre-medicated or replaced with a clinically suitable alternative.   No   2 Known diagnosis of type I myocardial infarction (resulting from primary reduction of flow from a culprit lesion likely to have a thrombotic component) within 7 days prior to the index procedure.   No   3 Known pregnancy or is nursing. Women of child-bearing potential should have a documented negative pregnancy test within 7 days prior to index procedure.   No   4 Planned target lesion treatment with atherectomy (rotational, orbital or laser), cutting balloon, thrombectomy, lithotripsy or an unapproved device during the index procedure.   No   5 Serum creatinine >2.0 mg/dl within 7 days prior to the index procedure.   No   6 Cerebrovascular accident within 6 months prior to the index procedure.   No   7 Active peptic ulcer or active gastrointestinal bleeding within 6 months prior to the index procedure.   No   8 Left ventricular ejection fraction (LVEF) <30% based on most recent measurement within a year of the index procedure (if LVEF is not available in the medical records, it may be obtained at the time of the index procedure, prior to enrollment).   No   9 Target lesion located within a bypass graft (venous or arterial) or graft anastomosis.   No   10 Previous percutaneous intervention, within 9 months before the study procedure, on lesions in a target vessel (including side branches) that are located within 10 mm from the current target lesion(s).   No   11 Target lesion(s) with complete total occlusion  () defined as the complete obstruction of a native coronary artery, exhibiting ANASTACIA 0 or AANSTACIA 1 flow, with an occlusion duration of at least 3 months.   No   12 Unstable hemodynamics or shock   No   13 Other medical condition which might, in the opinion of the investigator, put the patient at risk or confound the results of the study   No   Angiographic Exclusion Criteria: All must be NO   Angiographic Exclusion Criteria    14 Target lesion(s) longer than 32 mm by visual estimation.    No   15 Extreme angulation (90  or greater) within 5 mm of the target lesion.    No   16 Target lesion(s) demonstrating flow limiting dissection (NHLBI Grade C or higher) piror to deployment of the [place] NSE ALPHA.    No   17 Unprotected left main coronary artery disease (>50% diameter stenosis).    No   18 Coronary artery spasm of the target vessel in the absence of a significant stenosis. No   19 Target lesion(s) with angiographic presence of probable or definite thrombus. No   20 Target lesion(s) involves a bifurcation requiring treatment with more than one stent or pre-dilatation of a side branch >2.0 mm in diameter. No   21 Target lesion(s) located in bifurcation beyond stent struts. No   22 Target lesion(s) located distal to an implanted stent. No   23 Target lesion(s) with stent damage. No   24 Non-target lesion that meets any of the following criteria:   Located within a bypass graft (venous or arterial)   Located in an unprotected left main coronary artery   A    Involves a bifurcation No     Subject has not met all inclusion criteria and exclusion criteria.  Subject was not fully enrolled in the EXPANSE PTCA study.    Janet Melgar

## 2022-09-07 NOTE — PLAN OF CARE
Goal Outcome Evaluation:             Pt admitted for CA/P.PCI due dyspnea and hx of CAD. Pt prepped and ready for procedure.      Naima Burnett RN

## 2022-09-09 ENCOUNTER — TELEPHONE (OUTPATIENT)
Dept: CARDIOLOGY | Facility: CLINIC | Age: 72
End: 2022-09-09

## 2022-09-09 NOTE — TELEPHONE ENCOUNTER
Post Angiogram:       Patient had angiogram on 9/7/22 with direction to have a 2-4wk follow up.      Earliest available was in [place] 10/20/22- not a profered location and not with in the time frame he needs to be seen.    I scheduled him in [place] for now but please assist in scheduling.    Also placed on wait list.    Please call patient

## 2022-09-21 ENCOUNTER — OFFICE VISIT (OUTPATIENT)
Dept: CARDIOLOGY | Facility: CLINIC | Age: 72
End: 2022-09-21
Payer: COMMERCIAL

## 2022-09-21 VITALS
HEIGHT: 70 IN | SYSTOLIC BLOOD PRESSURE: 124 MMHG | WEIGHT: 195 LBS | DIASTOLIC BLOOD PRESSURE: 80 MMHG | HEART RATE: 60 BPM | BODY MASS INDEX: 27.92 KG/M2 | RESPIRATION RATE: 14 BRPM

## 2022-09-21 DIAGNOSIS — R00.2 HEART PALPITATIONS: Primary | ICD-10-CM

## 2022-09-21 DIAGNOSIS — I49.3 PVC'S (PREMATURE VENTRICULAR CONTRACTIONS): ICD-10-CM

## 2022-09-21 DIAGNOSIS — I25.10 CORONARY ARTERY DISEASE INVOLVING NATIVE CORONARY ARTERY OF NATIVE HEART WITHOUT ANGINA PECTORIS: ICD-10-CM

## 2022-09-21 PROCEDURE — 99214 OFFICE O/P EST MOD 30 MIN: CPT | Performed by: INTERNAL MEDICINE

## 2022-09-21 RX ORDER — GABAPENTIN 100 MG/1
100 CAPSULE ORAL PRN
COMMUNITY
End: 2024-03-04

## 2022-09-21 RX ORDER — ATORVASTATIN CALCIUM 20 MG/1
20 TABLET, FILM COATED ORAL DAILY
COMMUNITY
Start: 2022-09-06 | End: 2022-11-02

## 2022-09-21 NOTE — PROGRESS NOTES
Reviewed recent stress test, recent coronary angiogram, and recent Holter monitor with patient.  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  He had a stress echocardiogram which is not at all designed to review the right ventricle so I cannot exclude some right ventricular issues as a etiology for ventricular dysrhythmias.    He is asymptomatic at this time with the exception of some palpitations.    We will get lab cardiac MRI to look at his RV, LV, and look for any fibrosis.  He does have 2 uncles who  suddenly at age is less than 50.    We will also obtain a fasting lipid panel in 2 months as his atorvastatin was increased and we want his LDL cholesterol below 70 given his mild to moderate coronary artery disease on recent angiogram.    30 minutes spent with chart review, patient visit, and documentation.    Will also review his chart with one of our electrophysiologist after the cardiac MRI.    We will see him back in 1 year and will need at least annual echocardiograms given high baseline PVCs.

## 2022-09-21 NOTE — LETTER
2022    Richar Flor MD  [facility] [address] [place] [address]    RE: Alex WELLS Apolinar       Dear Colleague,     I had the pleasure of seeing Alex Jiang in the [facility].  Reviewed recent stress test, recent coronary angiogram, and recent Holter monitor with patient.   Apolinar has very frequent premature ventricular contractions at 8% but no evidence of PVC induced cardiomyopathy.  He had a stress echocardiogram which is not at all designed to review the right ventricle so I cannot exclude some right ventricular issues as a etiology for ventricular dysrhythmias.    He is asymptomatic at this time with the exception of some palpitations.    We will get lab cardiac MRI to look at his RV, LV, and look for any fibrosis.  He does have 2 uncles who  suddenly at age is less than 50.    We will also obtain a fasting lipid panel in 2 months as his atorvastatin was increased and we want his LDL cholesterol below 70 given his mild to moderate coronary artery disease on recent angiogram.    30 minutes spent with chart review, patient visit, and documentation.    Will also review his chart with one of our electrophysiologist after the cardiac MRI.    We will see him back in 1 year and will need at least annual echocardiograms given high baseline PVCs.    Thank you for allowing me to participate in the care of your patient.      Sincerely,     Booker Leon MD     [facility]  cc: No referring provider defined for this encounter.

## 2022-10-01 ENCOUNTER — HEALTH MAINTENANCE LETTER (OUTPATIENT)
Age: 72
End: 2022-10-01

## 2022-10-03 ENCOUNTER — HOSPITAL ENCOUNTER (OUTPATIENT)
Dept: MRI IMAGING | Facility: HOSPITAL | Age: 72
Discharge: HOME | End: 2022-10-03
Attending: INTERNAL MEDICINE
Payer: COMMERCIAL

## 2022-10-03 DIAGNOSIS — I25.10 CORONARY ARTERY DISEASE INVOLVING NATIVE CORONARY ARTERY OF NATIVE HEART WITHOUT ANGINA PECTORIS: ICD-10-CM

## 2022-10-03 DIAGNOSIS — R00.2 HEART PALPITATIONS: ICD-10-CM

## 2022-10-03 DIAGNOSIS — I49.3 PVC'S (PREMATURE VENTRICULAR CONTRACTIONS): ICD-10-CM

## 2022-10-03 PROCEDURE — 75565 CARD MRI VELOC FLOW MAPPING: CPT

## 2022-10-03 PROCEDURE — 75561 CARDIAC MRI FOR MORPH W/DYE: CPT | Mod: 26 | Performed by: GENERAL ACUTE CARE HOSPITAL

## 2022-10-03 PROCEDURE — 999N000122 MR MYOCARDIUM  OVERREAD

## 2022-10-03 PROCEDURE — 255N000002 HC RX 255 OP 636: Performed by: INTERNAL MEDICINE

## 2022-10-03 PROCEDURE — 75565 CARD MRI VELOC FLOW MAPPING: CPT | Mod: 26 | Performed by: GENERAL ACUTE CARE HOSPITAL

## 2022-10-03 PROCEDURE — A9585 GADOBUTROL INJECTION: HCPCS | Performed by: INTERNAL MEDICINE

## 2022-10-03 RX ORDER — GADOBUTROL 604.72 MG/ML
19 INJECTION INTRAVENOUS ONCE
Status: COMPLETED | OUTPATIENT
Start: 2022-10-03 | End: 2022-10-03

## 2022-10-03 RX ORDER — GADOBUTROL 604.72 MG/ML
0.1 INJECTION INTRAVENOUS ONCE
Status: DISCONTINUED | OUTPATIENT
Start: 2022-10-03 | End: 2022-10-03 | Stop reason: CLARIF

## 2022-10-03 RX ADMIN — GADOBUTROL 19 ML: 604.72 INJECTION INTRAVENOUS at 10:17

## 2022-10-05 ENCOUNTER — TRANSFERRED RECORDS (OUTPATIENT)
Dept: HEALTH INFORMATION MANAGEMENT | Facility: CLINIC | Age: 72
End: 2022-10-05

## 2022-10-31 NOTE — PROGRESS NOTES
HEART CARE ENCOUNTER CONSULTATON NOTE      [facility]  [phone]      Assessment/Recommendations   Assessment/Plan:    Alex Jiang is a very pleasant 72 year old male with PMH of frequent PVCs for which he was referred to me. He also has a PMH of moderate CAD and hyperlipidemia.    1. PVCs  - Current burden of PVCs is 8% and is mostly asymptomatic at thi point as he is able to do all of his day to day activities  - LVEF normal per recent cardiac MRI  - Continue monitoring at this point  - Recommend annual Holter to assess PVC burden       History of Present Illness/Subjective    HPI: Alex Jiang is a very pleasant 72 year old male with PMH of frequent PVCs for which he was referred to me. He also has a PMH of moderate CAD and hyperlipidemia.    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 A Holter was recommended at that time but he did not pursue it. More recently he has tracked his PVC burden with the [employer] leodan and also had a 24 hour Holter monitor which showed a burden of 8% PVCs. An MRI showed a normal LV systolic function. An angiogram showed moderate CAD.    He used to consume high amounts of caffeine but has significantly reduced it now. He also used THC in the past for sleep but has since stopped using it.    He is physically very active and is able to do all of his activities without any impairment.       Recent ECG(personally reviewed):  Sinus rhythm with 1st degree AV delay  PVC noted      Recent MRI (personally reviewed):  1.  Normal left ventricular size, wall thickness and systolic function. The quantified left ventricular  ejection fraction is 63%.  2.  Normal right ventricular size and systolic function.  The quantified right ventricular ejection  fraction is 56%.  3.  No evidence of prior myocardial infarction, focal nonvascular myocardial fibrosis, or infiltrative  disease.  4.  No significant valvular abnormalities.  5.   "Mildly dilated ascending aorta measuring 4.1 cm.    Recent Coronary Angiogram Results (personally reviewed):  Ost LM to Mid LM lesion is 25% stenosed.  Mid LM to Dist LM lesion is 25% stenosed.  2nd Diag lesion is 50% stenosed.  Mid LAD lesion is 30% stenosed.  1st Mrg lesion is 40% stenosed.  Prox RCA lesion is 25% stenosed.  RPDA lesion is 30% stenosed.  Dist RCA lesion is 30% stenosed.      Labs below reviewed personally     Physical Examination  Review of Systems   Vitals: /74 (BP Location: Right arm, Patient Position: Sitting, Cuff Size: Adult Large)   Pulse 80   Resp 16   Ht 1.778 m (5' 10\")   Wt 85.9 kg (189 lb 6.4 oz)   BMI 27.18 kg/m    BMI= Body mass index is 27.18 kg/m .  Wt Readings from Last 3 Encounters:   11/02/22 85.9 kg (189 lb 6.4 oz)   09/21/22 88.5 kg (195 lb)   09/07/22 85.7 kg (189 lb)       General Appearance:   no distress, normal body habitus   ENT/Mouth: membranes moist, no oral lesions or bleeding gums.      EYES:  no scleral icterus, normal conjunctivae   Neck: no carotid bruits or thyromegaly   Chest/Lungs:   lungs are clear to auscultation, no rales or wheezing, no sternal scar, equal chest wall expansion    Cardiovascular:   Regular. Normal first and second heart sounds with systolic murmurs, rubs, or gallops; the carotid, radial and posterior tibial pulses are intact, no edema bilaterally    Abdomen:  no organomegaly, masses, bruits, or tenderness; bowel sounds are present   Extremities: no cyanosis or clubbing   Skin: no xanthelasma, warm.    Neurologic: normal  bilateral, no tremors     Psychiatric: alert and oriented x3, calm        Please refer above for cardiac ROS details.        Medical History  Surgical History Family History Social History   No past medical history on file.  Past Surgical History:   Procedure Laterality Date     CV CORONARY ANGIOGRAM N/A 9/7/2022    Procedure: Coronary Angiogram;  Surgeon: Santosh Dougherty MD;  Location: [facility] CATH LAB CV "     No family history on file.     Social History     Socioeconomic History     Marital status:      Spouse name: Not on file     Number of children: Not on file     Years of education: Not on file     Highest education level: Not on file   Occupational History     Not on file   Tobacco Use     Smoking status: Former     Types: Cigarettes     Quit date:      Years since quittin.8     Smokeless tobacco: Never   Substance and Sexual Activity     Alcohol use: Not on file     Drug use: Not on file     Sexual activity: Not on file   Other Topics Concern     Not on file   Social History Narrative     Not on file     Social Determinants of Health     Financial Resource Strain: Not on file   Food Insecurity: Not on file   Transportation Needs: Not on file   Physical Activity: Not on file   Stress: Not on file   Social Connections: Not on file   Intimate Partner Violence: Not on file   Housing Stability: Not on file           Medications  Allergies   Current Outpatient Medications   Medication Sig Dispense Refill     albuterol (PROAIR HFA/PROVENTIL HFA/VENTOLIN HFA) 108 (90 Base) MCG/ACT inhaler Inhale 2 puffs into the lungs every 6 hours as needed       gabapentin (NEURONTIN) 100 MG capsule Take 100 mg by mouth as needed       rizatriptan (MAXALT) 10 MG tablet Take 10 mg by mouth at onset of headache for migraine       rosuvastatin (CRESTOR) 10 MG tablet Take 10 mg by mouth At Bedtime       therapeutic multivitamin (THERAGRAN) tablet [THERAPEUTIC MULTIVITAMIN (THERAGRAN) TABLET] Take 1 tablet by mouth daily.         Allergies   Allergen Reactions     Oyster Extract Anaphylaxis     Cephalosporins Unknown     Penicillins Unknown     Sulfa (Sulfonamide Antibiotics) [Sulfa Drugs] Unknown          Lab Results    Chemistry/lipid CBC Cardiac Enzymes/BNP/TSH/INR   Recent Labs   Lab Test 21  0832   CHOL 156   HDL 45   LDL 97   TRIG 69     Recent Labs   Lab Test 21  0832 21  0828 21  0854   LDL  97 87 145*     Recent Labs   Lab Test 09/07/22  1006      POTASSIUM 3.9   CHLORIDE 105   CO2 25      BUN 17   CR 0.78   GFRESTIMATED >90   LON 9.0     Recent Labs   Lab Test 09/07/22  1006 08/03/22  1627 11/12/21  0832   CR 0.78 0.74 0.75     No results for input(s): A1C in the last 83625 hours.       Recent Labs   Lab Test 09/07/22  1006   WBC 6.4   HGB 16.2   HCT 48.2   MCV 91        Recent Labs   Lab Test 09/07/22  1006   HGB 16.2    No results for input(s): TROPONINI in the last 10726 hours.  No results for input(s): BNP, NTBNPI, NTBNP in the last 27222 hours.  Recent Labs   Lab Test 08/03/22  1627   TSH 2.10     No results for input(s): INR in the last 18185 hours.     Gita Smith MD

## 2022-11-02 ENCOUNTER — OFFICE VISIT (OUTPATIENT)
Dept: CARDIOLOGY | Facility: CLINIC | Age: 72
End: 2022-11-02
Payer: COMMERCIAL

## 2022-11-02 VITALS
BODY MASS INDEX: 27.11 KG/M2 | HEART RATE: 80 BPM | DIASTOLIC BLOOD PRESSURE: 74 MMHG | WEIGHT: 189.4 LBS | HEIGHT: 70 IN | RESPIRATION RATE: 16 BRPM | SYSTOLIC BLOOD PRESSURE: 104 MMHG

## 2022-11-02 DIAGNOSIS — I49.3 PVC'S (PREMATURE VENTRICULAR CONTRACTIONS): Primary | ICD-10-CM

## 2022-11-02 PROCEDURE — 99214 OFFICE O/P EST MOD 30 MIN: CPT | Performed by: INTERNAL MEDICINE

## 2022-11-02 RX ORDER — ROSUVASTATIN CALCIUM 10 MG/1
10 TABLET, COATED ORAL AT BEDTIME
COMMUNITY
Start: 2022-10-05 | End: 2024-03-04

## 2022-11-02 NOTE — PATIENT INSTRUCTIONS
[facility]  Cardiac Electrophysiology  [address]  [place] [address]   Office: [phone]  Fax: [phone]       Thank you for seeing us in clinic today - it is a pleasure to be a part of your care team.  Below is a summary of our plan from today's visit.      Frequent PVCs  - Continue to monitor for now  - Recommend annual Holter with Dr Leon  - Follow up as needed    Please do not hesitate to be in touch with our office at [phone] with any questions that may arise.      Thank you for trusting us with your care,    Gita Smith MD  Clinical Cardiac Electrophysiology  [facility]  [address]  [place] [address]   Office: [phone]  Fax: [phone]

## 2022-11-02 NOTE — LETTER
11/2/2022    Richar Flor MD  [address] [place] [address]    RE: Alex Schafferlan       Dear Colleague,     I had the [facility] of [facility] in the [facility].    HEART CARE ENCOUNTER CONSULTATON NOTE      [facility]  [phone]      Assessment/Recommendations   Assessment/Plan:    Alex Jiang is a very pleasant 72 year old male with PMH of frequent PVCs for which he was referred to me. He also has a PMH of moderate CAD and hyperlipidemia.    1. PVCs  - Current burden of PVCs is 8% and is mostly asymptomatic at thi point as he is able to do all of his day to day activities  - LVEF normal per recent cardiac MRI  - Continue monitoring at this point  - Recommend annual Holter to assess PVC burden       History of Present Illness/Subjective    HPI: Alex Jiang is a very pleasant 72 year old male with PMH of frequent PVCs for which he was referred to me. He also has a PMH of moderate CAD and hyperlipidemia.    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 A Holter was recommended at that time but he did not pursue it. More recently he has tracked his PVC burden with the [employer] leodan and also had a 24 hour Holter monitor which showed a burden of 8% PVCs. An MRI showed a normal LV systolic function. An angiogram showed moderate CAD.    He used to consume high amounts of caffeine but has significantly reduced it now. He also used THC in the past for sleep but has since stopped using it.    He is physically very active and is able to do all of his activities without any impairment.       Recent ECG(personally reviewed):  Sinus rhythm with 1st degree AV delay  PVC noted      Recent MRI (personally reviewed):  1.  Normal left ventricular size, wall thickness and systolic function. The quantified left ventricular  ejection fraction is 63%.  2.  Normal right ventricular size and systolic function.  The quantified right  "ventricular ejection  fraction is 56%.  3.  No evidence of prior myocardial infarction, focal nonvascular myocardial fibrosis, or infiltrative  disease.  4.  No significant valvular abnormalities.  5.  Mildly dilated ascending aorta measuring 4.1 cm.    Recent Coronary Angiogram Results (personally reviewed):    Ost LM to Mid LM lesion is 25% stenosed.    Mid LM to Dist LM lesion is 25% stenosed.    2nd Diag lesion is 50% stenosed.    Mid LAD lesion is 30% stenosed.    1st Mrg lesion is 40% stenosed.    Prox RCA lesion is 25% stenosed.    RPDA lesion is 30% stenosed.    Dist RCA lesion is 30% stenosed.      Labs below reviewed personally     Physical Examination  Review of Systems   Vitals: /74 (BP Location: Right arm, Patient Position: Sitting, Cuff Size: Adult Large)   Pulse 80   Resp 16   Ht 1.778 m (5' 10\")   Wt 85.9 kg (189 lb 6.4 oz)   BMI 27.18 kg/m    BMI= Body mass index is 27.18 kg/m .  Wt Readings from Last 3 Encounters:   11/02/22 85.9 kg (189 lb 6.4 oz)   09/21/22 88.5 kg (195 lb)   09/07/22 85.7 kg (189 lb)       General Appearance:   no distress, normal body habitus   ENT/Mouth: membranes moist, no oral lesions or bleeding gums.      EYES:  no scleral icterus, normal conjunctivae   Neck: no carotid bruits or thyromegaly   Chest/Lungs:   lungs are clear to auscultation, no rales or wheezing, no sternal scar, equal chest wall expansion    Cardiovascular:   Regular. Normal first and second heart sounds with systolic murmurs, rubs, or gallops; the carotid, radial and posterior tibial pulses are intact, no edema bilaterally    Abdomen:  no organomegaly, masses, bruits, or tenderness; bowel sounds are present   Extremities: no cyanosis or clubbing   Skin: no xanthelasma, warm.    Neurologic: normal  bilateral, no tremors     Psychiatric: alert and oriented x3, calm        Please refer above for cardiac ROS details.        Medical History  Surgical History Family History Social History   No " past medical history on file.  Past Surgical History:   Procedure Laterality Date     CV CORONARY ANGIOGRAM N/A 2022    Procedure: Coronary Angiogram;  Surgeon: Santosh Dougherty MD;  Location: [facility] LAB CV     No family history on file.     Social History     Socioeconomic History     Marital status:      Spouse name: Not on file     Number of children: Not on file     Years of education: Not on file     Highest education level: Not on file   Occupational History     Not on file   Tobacco Use     Smoking status: Former     Types: Cigarettes     Quit date:      Years since quittin.8     Smokeless tobacco: Never   Substance and Sexual Activity     Alcohol use: Not on file     Drug use: Not on file     Sexual activity: Not on file   Other Topics Concern     Not on file   Social History Narrative     Not on file     Social Determinants of Health     Financial Resource Strain: Not on file   Food Insecurity: Not on file   Transportation Needs: Not on file   Physical Activity: Not on file   Stress: Not on file   Social Connections: Not on file   Intimate Partner Violence: Not on file   Housing Stability: Not on file           Medications  Allergies   Current Outpatient Medications   Medication Sig Dispense Refill     albuterol (PROAIR HFA/PROVENTIL HFA/VENTOLIN HFA) 108 (90 Base) MCG/ACT inhaler Inhale 2 puffs into the lungs every 6 hours as needed       gabapentin (NEURONTIN) 100 MG capsule Take 100 mg by mouth as needed       rizatriptan (MAXALT) 10 MG tablet Take 10 mg by mouth at onset of headache for migraine       rosuvastatin (CRESTOR) 10 MG tablet Take 10 mg by mouth At Bedtime       therapeutic multivitamin (THERAGRAN) tablet [THERAPEUTIC MULTIVITAMIN (THERAGRAN) TABLET] Take 1 tablet by mouth daily.         Allergies   Allergen Reactions     Oyster Extract Anaphylaxis     Cephalosporins Unknown     Penicillins Unknown     Sulfa (Sulfonamide Antibiotics) [Sulfa Drugs] Unknown           Lab Results    Chemistry/lipid CBC Cardiac Enzymes/BNP/TSH/INR   Recent Labs   Lab Test 11/12/21  0832   CHOL 156   HDL 45   LDL 97   TRIG 69     Recent Labs   Lab Test 11/12/21  0832 04/08/21  0828 01/25/21  0854   LDL 97 87 145*     Recent Labs   Lab Test 09/07/22  1006      POTASSIUM 3.9   CHLORIDE 105   CO2 25      BUN 17   CR 0.78   GFRESTIMATED >90   LON 9.0     Recent Labs   Lab Test 09/07/22  1006 08/03/22  1627 11/12/21  0832   CR 0.78 0.74 0.75     No results for input(s): A1C in the last 78247 hours.       Recent Labs   Lab Test 09/07/22  1006   WBC 6.4   HGB 16.2   HCT 48.2   MCV 91        Recent Labs   Lab Test 09/07/22  1006   HGB 16.2    No results for input(s): TROPONINI in the last 32825 hours.  No results for input(s): BNP, NTBNPI, NTBNP in the last 86033 hours.  Recent Labs   Lab Test 08/03/22  1627   TSH 2.10     No results for input(s): INR in the last 71657 hours.     Gita Smith MD    Thank you for allowing me to participate in the care of your patient.      Sincerely,     Gita Smith MD     [facility]  cc: No referring provider defined for this encounter.

## 2022-12-15 ENCOUNTER — LAB REQUISITION (OUTPATIENT)
Dept: LAB | Facility: CLINIC | Age: 72
End: 2022-12-15
Payer: COMMERCIAL

## 2022-12-15 DIAGNOSIS — E78.5 HYPERLIPIDEMIA, UNSPECIFIED: ICD-10-CM

## 2022-12-15 DIAGNOSIS — N40.0 BENIGN PROSTATIC HYPERPLASIA WITHOUT LOWER URINARY TRACT SYMPTOMS: ICD-10-CM

## 2022-12-15 LAB
ALBUMIN SERPL BCG-MCNC: 4.3 G/DL (ref 3.5–5.2)
ALP SERPL-CCNC: 54 U/L (ref 40–129)
ALT SERPL W P-5'-P-CCNC: 37 U/L (ref 10–50)
ANION GAP SERPL CALCULATED.3IONS-SCNC: 12 MMOL/L (ref 7–15)
AST SERPL W P-5'-P-CCNC: 31 U/L (ref 10–50)
BILIRUB SERPL-MCNC: 0.4 MG/DL
BUN SERPL-MCNC: 14.4 MG/DL (ref 8–23)
CALCIUM SERPL-MCNC: 9.4 MG/DL (ref 8.8–10.2)
CHLORIDE SERPL-SCNC: 105 MMOL/L (ref 98–107)
CHOLEST SERPL-MCNC: 122 MG/DL
CREAT SERPL-MCNC: 0.68 MG/DL (ref 0.67–1.17)
DEPRECATED HCO3 PLAS-SCNC: 25 MMOL/L (ref 22–29)
GFR SERPL CREATININE-BSD FRML MDRD: >90 ML/MIN/1.73M2
GLUCOSE SERPL-MCNC: 102 MG/DL (ref 70–99)
HDLC SERPL-MCNC: 44 MG/DL
LDLC SERPL CALC-MCNC: 63 MG/DL
NONHDLC SERPL-MCNC: 78 MG/DL
POTASSIUM SERPL-SCNC: 4.6 MMOL/L (ref 3.4–5.3)
PROT SERPL-MCNC: 6.3 G/DL (ref 6.4–8.3)
PSA SERPL-MCNC: 0.48 NG/ML (ref 0–6.5)
SODIUM SERPL-SCNC: 142 MMOL/L (ref 136–145)
TRIGL SERPL-MCNC: 76 MG/DL

## 2022-12-15 PROCEDURE — G0103 PSA SCREENING: HCPCS | Mod: ORL | Performed by: FAMILY MEDICINE

## 2022-12-15 PROCEDURE — 80053 COMPREHEN METABOLIC PANEL: CPT | Mod: ORL | Performed by: FAMILY MEDICINE

## 2022-12-15 PROCEDURE — 80061 LIPID PANEL: CPT | Mod: ORL | Performed by: FAMILY MEDICINE

## 2022-12-15 PROCEDURE — 84153 ASSAY OF PSA TOTAL: CPT | Mod: ORL | Performed by: FAMILY MEDICINE

## 2023-01-09 ENCOUNTER — LAB REQUISITION (OUTPATIENT)
Dept: LAB | Facility: CLINIC | Age: 73
End: 2023-01-09
Payer: COMMERCIAL

## 2023-01-09 DIAGNOSIS — L72.3 SEBACEOUS CYST: ICD-10-CM

## 2023-01-09 PROCEDURE — 87070 CULTURE OTHR SPECIMN AEROBIC: CPT | Mod: ORL | Performed by: FAMILY MEDICINE

## 2023-01-11 LAB — BACTERIA WND CULT: NO GROWTH

## 2023-08-18 ENCOUNTER — MEDICAL CORRESPONDENCE (OUTPATIENT)
Dept: HEALTH INFORMATION MANAGEMENT | Facility: CLINIC | Age: 73
End: 2023-08-18
Payer: COMMERCIAL

## 2023-08-18 ENCOUNTER — TRANSFERRED RECORDS (OUTPATIENT)
Dept: HEALTH INFORMATION MANAGEMENT | Facility: CLINIC | Age: 73
End: 2023-08-18
Payer: COMMERCIAL

## 2023-08-21 ENCOUNTER — LAB REQUISITION (OUTPATIENT)
Dept: LAB | Facility: CLINIC | Age: 73
End: 2023-08-21
Payer: COMMERCIAL

## 2023-08-21 ENCOUNTER — TRANSCRIBE ORDERS (OUTPATIENT)
Dept: OTHER | Age: 73
End: 2023-08-21

## 2023-08-21 DIAGNOSIS — G35 MULTIPLE SCLEROSIS (H): Primary | ICD-10-CM

## 2023-08-21 DIAGNOSIS — E78.5 HYPERLIPIDEMIA, UNSPECIFIED: ICD-10-CM

## 2023-08-21 LAB
ANION GAP SERPL CALCULATED.3IONS-SCNC: 11 MMOL/L (ref 7–15)
BUN SERPL-MCNC: 15.6 MG/DL (ref 8–23)
CALCIUM SERPL-MCNC: 9.3 MG/DL (ref 8.8–10.2)
CHLORIDE SERPL-SCNC: 103 MMOL/L (ref 98–107)
CREAT SERPL-MCNC: 0.7 MG/DL (ref 0.67–1.17)
DEPRECATED HCO3 PLAS-SCNC: 27 MMOL/L (ref 22–29)
GFR SERPL CREATININE-BSD FRML MDRD: >90 ML/MIN/1.73M2
GLUCOSE SERPL-MCNC: 106 MG/DL (ref 70–99)
POTASSIUM SERPL-SCNC: 4.6 MMOL/L (ref 3.4–5.3)
SODIUM SERPL-SCNC: 141 MMOL/L (ref 136–145)

## 2023-08-21 PROCEDURE — 80048 BASIC METABOLIC PNL TOTAL CA: CPT | Mod: ORL | Performed by: FAMILY MEDICINE

## 2023-10-15 ENCOUNTER — HEALTH MAINTENANCE LETTER (OUTPATIENT)
Age: 73
End: 2023-10-15

## 2023-12-14 ENCOUNTER — LAB REQUISITION (OUTPATIENT)
Dept: LAB | Facility: CLINIC | Age: 73
End: 2023-12-14
Payer: COMMERCIAL

## 2023-12-14 DIAGNOSIS — E78.5 HYPERLIPIDEMIA, UNSPECIFIED: ICD-10-CM

## 2023-12-14 DIAGNOSIS — N40.0 BENIGN PROSTATIC HYPERPLASIA WITHOUT LOWER URINARY TRACT SYMPTOMS: ICD-10-CM

## 2023-12-14 LAB
CHOLEST SERPL-MCNC: 137 MG/DL
FASTING STATUS PATIENT QL REPORTED: NORMAL
HDLC SERPL-MCNC: 45 MG/DL
LDLC SERPL CALC-MCNC: 78 MG/DL
NONHDLC SERPL-MCNC: 92 MG/DL
PSA SERPL DL<=0.01 NG/ML-MCNC: 0.56 NG/ML (ref 0–6.5)
TRIGL SERPL-MCNC: 71 MG/DL

## 2023-12-14 PROCEDURE — 84153 ASSAY OF PSA TOTAL: CPT | Mod: ORL | Performed by: FAMILY MEDICINE

## 2023-12-14 PROCEDURE — G0103 PSA SCREENING: HCPCS | Mod: ORL | Performed by: FAMILY MEDICINE

## 2023-12-14 PROCEDURE — 80061 LIPID PANEL: CPT | Mod: ORL | Performed by: FAMILY MEDICINE

## 2023-12-19 ENCOUNTER — TRANSFERRED RECORDS (OUTPATIENT)
Dept: HEALTH INFORMATION MANAGEMENT | Facility: CLINIC | Age: 73
End: 2023-12-19
Payer: COMMERCIAL

## 2023-12-20 ENCOUNTER — MEDICAL CORRESPONDENCE (OUTPATIENT)
Dept: HEALTH INFORMATION MANAGEMENT | Facility: CLINIC | Age: 73
End: 2023-12-20
Payer: COMMERCIAL

## 2023-12-28 ENCOUNTER — OFFICE VISIT (OUTPATIENT)
Dept: CARDIOLOGY | Facility: CLINIC | Age: 73
End: 2023-12-28
Payer: COMMERCIAL

## 2023-12-28 VITALS
DIASTOLIC BLOOD PRESSURE: 68 MMHG | SYSTOLIC BLOOD PRESSURE: 124 MMHG | BODY MASS INDEX: 27.49 KG/M2 | RESPIRATION RATE: 16 BRPM | HEIGHT: 70 IN | HEART RATE: 75 BPM | WEIGHT: 192 LBS

## 2023-12-28 DIAGNOSIS — I25.10 CORONARY ARTERY DISEASE INVOLVING NATIVE CORONARY ARTERY OF NATIVE HEART WITHOUT ANGINA PECTORIS: ICD-10-CM

## 2023-12-28 DIAGNOSIS — I49.3 PVC'S (PREMATURE VENTRICULAR CONTRACTIONS): Primary | ICD-10-CM

## 2023-12-28 DIAGNOSIS — E78.2 MIXED HYPERLIPIDEMIA: ICD-10-CM

## 2023-12-28 DIAGNOSIS — I77.810 ASCENDING AORTA DILATION (H): ICD-10-CM

## 2023-12-28 PROCEDURE — 99214 OFFICE O/P EST MOD 30 MIN: CPT

## 2023-12-28 NOTE — PATIENT INSTRUCTIONS
It was a pleasure taking part in your care today:    - Inform clinic if rosuvastatin is discontinued  - Schedule Holter monitor, echo     Please call the [facility] clinic with any questions or concerns at [phone].     Gabby Lagunas PA-C

## 2023-12-28 NOTE — PROGRESS NOTES
HEART CARE ENCOUNTER CONSULTATON NOTE      [facility]  [phone]      Assessment/Recommendations   Assessment:   Coronary artery disease: mild-moderate on coronary angiogram, denies angina  PVCs: 8% on holter monitor, MRI without evidence of MI, scar or fibrosis - no recent palpitations  - EP suggests a Holter monitor yearly   Hyperlipidemia: LDL 78, rosuvastatin 10 mg  - Experiencing myalgias on rosuvastatin for the last year.  Has tried holding medication with few times with improvement in myalgias.  Currently taking every other day at suggestion of PCP, but feels that ADLs are difficult on medication and will likely discontinue.  We discussed goal of LDL<70 and statin benefit with known CAD.  He will notify us if he discontinues rosuvastatin.  Mild ascending aortic dilation: 4.1 cm on echo 9/2022, repeat for monitoring    Plan:   Repeat echocardiogram  Repeat 24 hour holter monitor  Return to exercise and continue healthy diet      Follow up in 1 year or sooner as needed/pending results     History of Present Illness/Subjective    HPI: Alex Jiang is a 73 year old male with PMHx of frequent PVCs, CAD, HLD, mild ascending aortic dilation, family history of CAD, multiple sclerosis, former smoker presents for follow up.  Diagnosis of frequent PVCs with 8% burden on Holter monitor 8/2022, abnormal echocardiogram stress test led to coronary angiogram which revealed nonobstructive CAD.  Cardiac MRI 10/2022 showed normal LVEF with ascending aortic dilation 4.1 cm, without evidence of MI, scar.    Patient reports decrease in exercise in recent years.  He plans to return to more regular exercise.  He has had limited palpitations, have not been bothering him recently with known history of PVCs.  Monitors with [employer] device.  Has had stable intermittent lower extremity edema, worse with increased sodium intake and throughout the day.  Improves in the morning.    He does have concerns regarding  "myalgias on rosuvastatin.  Has discontinued medic patient for short periods multiple times in the last year with improvement.  Currently taking rosuvastatin every other day but still feeling poorly.  Had taken atorvastatin for many years and may have had myalgias at that time as well.  He previously controlled cholesterol very well with diet and exercise.  Continues to have healthy diet.     He denies shortness of breath, dyspnea on exertion, and chest pain.        Cardiac MRI 10/2022:  1.  Normal left ventricular size, wall thickness and systolic function. The quantified left ventricular  ejection fraction is 63%.  2.  Normal right ventricular size and systolic function.  The quantified right ventricular ejection  fraction is 56%.  3.  No evidence of prior myocardial infarction, focal nonvascular myocardial fibrosis, or infiltrative  disease.  4.  No significant valvular abnormalities.  5.  Mildly dilated ascending aorta measuring 4.1 cm.    Coronary angiogram 9/2022:  Ost LM to Mid LM lesion is 25% stenosed.  Mid LM to Dist LM lesion is 25% stenosed.  2nd Diag lesion is 50% stenosed.  Mid LAD lesion is 30% stenosed.  1st Mrg lesion is 40% stenosed.  Prox RCA lesion is 25% stenosed.  RPDA lesion is 30% stenosed.  Dist RCA lesion is 30% stenosed.    Holter monitoring from 8/11/2022 to 8/12/2022 (duration 24hrs). Predominant underlying rhythm was sinus rhythm, 50 to 101bpm, average 74bpm. No nonsustained or sustained tachyarrhythmias. No atrial fibrillation. There were no pauses of greater than 3 seconds. Rare supraventricular ectopic beats (<1%). Frequent premature ventricular contractions (8%). Symptom triggers (2, irregular pulse) correlated to sinus rhythm with PVCs and PACs.     Physical Examination  Review of Systems   Vitals: /68 (BP Location: Left arm, Patient Position: Sitting, Cuff Size: Adult Regular)   Pulse 75   Resp 16   Ht 1.778 m (5' 10\")   Wt 87.1 kg (192 lb)   BMI 27.55 kg/m    BMI= Body " mass index is 27.55 kg/m .  Wt Readings from Last 3 Encounters:   23 87.1 kg (192 lb)   22 85.9 kg (189 lb 6.4 oz)   22 88.5 kg (195 lb)           ENT/Mouth: membranes moist, no oral lesions or bleeding gums.      EYES:  no scleral icterus, normal conjunctivae                    Neck: No carotid bruit or thyromegaly   Chest/Lungs:   lungs are clear to auscultation, no rales or wheezing, , equal chest wall expansion    Cardiovascular:   Regular. Normal first and second heart sounds with soft 1/6 systolic murmur, rubs, or gallops; the carotid, radial and posterior tibial pulses are intact, absent edema bilaterally        Extremities: no cyanosis or clubbing   Skin: no xanthelasma, warm.    Neurologic: no tremors     Psychiatric: alert and oriented x3, calm        Please refer above for cardiac ROS details.        Medical History  Surgical History Family History Social History   No past medical history on file.  Past Surgical History:   Procedure Laterality Date    CV CORONARY ANGIOGRAM N/A 2022    Procedure: Coronary Angiogram;  Surgeon: Santosh Dougherty MD;  Location: [facility] CATH LAB CV     No family history on file.     Social History     Socioeconomic History    Marital status:      Spouse name: Not on file    Number of children: Not on file    Years of education: Not on file    Highest education level: Not on file   Occupational History    Not on file   Tobacco Use    Smoking status: Former     Types: Cigarettes     Quit date:      Years since quittin.0    Smokeless tobacco: Never   Substance and Sexual Activity    Alcohol use: Not on file    Drug use: Not on file    Sexual activity: Not on file   Other Topics Concern    Not on file   Social History Narrative    Not on file     Social Determinants of Health     Financial Resource Strain: Not on file   Food Insecurity: Not on file   Transportation Needs: Not on file   Physical Activity: Not on file   Stress: Not on file  "  Social Connections: Not on file   Interpersonal Safety: Not on file   Housing Stability: Not on file           Medications  Allergies   Current Outpatient Medications   Medication Sig Dispense Refill    rizatriptan (MAXALT) 10 MG tablet Take 10 mg by mouth at onset of headache for migraine      rosuvastatin (CRESTOR) 10 MG tablet Take 10 mg by mouth At Bedtime      therapeutic multivitamin (THERAGRAN) tablet [THERAPEUTIC MULTIVITAMIN (THERAGRAN) TABLET] Take 1 tablet by mouth daily.      albuterol (PROAIR HFA/PROVENTIL HFA/VENTOLIN HFA) 108 (90 Base) MCG/ACT inhaler Inhale 2 puffs into the lungs every 6 hours as needed (Patient not taking: Reported on 12/28/2023)      gabapentin (NEURONTIN) 100 MG capsule Take 100 mg by mouth as needed (Patient not taking: Reported on 12/28/2023)         Allergies   Allergen Reactions    Oyster Extract Anaphylaxis    Cephalosporins Unknown    Penicillins Unknown    Sulfa (Sulfonamide Antibiotics) [Sulfa Antibiotics] Unknown          Lab Results    Chemistry/lipid CBC Cardiac Enzymes/BNP/TSH/INR   Recent Labs   Lab Test 12/14/23  0800   CHOL 137   HDL 45   LDL 78   TRIG 71     Recent Labs   Lab Test 12/14/23  0800 12/15/22  0822 11/12/21  0832   LDL 78 63 97     Recent Labs   Lab Test 08/21/23  0831      POTASSIUM 4.6   CHLORIDE 103   CO2 27   *   BUN 15.6   CR 0.70   GFRESTIMATED >90   LON 9.3     Recent Labs   Lab Test 08/21/23  0831 12/15/22  0822 09/07/22  1006   CR 0.70 0.68 0.78     No results for input(s): \"A1C\" in the last 50578 hours.       Recent Labs   Lab Test 09/07/22  1006   WBC 6.4   HGB 16.2   HCT 48.2   MCV 91        Recent Labs   Lab Test 09/07/22  1006   HGB 16.2    No results for input(s): \"TROPONINI\" in the last 30874 hours.  No results for input(s): \"BNP\", \"NTBNPI\", \"NTBNP\" in the last 77144 hours.  Recent Labs   Lab Test 08/03/22  1627   TSH 2.10     No results for input(s): \"INR\" in the last 99715 hours.     Gabby Lagunas, " MICHELLE

## 2023-12-28 NOTE — LETTER
12/28/2023    Richar Flor MD  [address] [place] [address]    RE: Alex Jiang       Dear Colleague,     I had the pleasure of seeing Alex Jiang in the [facility].    HEART CARE ENCOUNTER CONSULTATON NOTE      [facility]  [phone]      Assessment/Recommendations   Assessment:   Coronary artery disease: mild-moderate on coronary angiogram, denies angina  PVCs: 8% on holter monitor, MRI without evidence of MI, scar or fibrosis - no recent palpitations  - EP suggests a Holter monitor yearly   Hyperlipidemia: LDL 78, rosuvastatin 10 mg  - Experiencing myalgias on rosuvastatin for the last year.  Has tried holding medication with few times with improvement in myalgias.  Currently taking every other day at suggestion of PCP, but feels that ADLs are difficult on medication and will likely discontinue.  We discussed goal of LDL<70 and statin benefit with known CAD.  He will notify us if he discontinues rosuvastatin.  Mild ascending aortic dilation: 4.1 cm on echo 9/2022, repeat for monitoring    Plan:   Repeat echocardiogram  Repeat 24 hour holter monitor  Return to exercise and continue healthy diet      Follow up in 1 year or sooner as needed/pending results     History of Present Illness/Subjective    HPI: Alex Jiang is a 73 year old male with PMHx of frequent PVCs, CAD, HLD, mild ascending aortic dilation, family history of CAD, multiple sclerosis, former smoker presents for follow up.  Diagnosis of frequent PVCs with 8% burden on Holter monitor 8/2022, abnormal echocardiogram stress test led to coronary angiogram which revealed nonobstructive CAD.  Cardiac MRI 10/2022 showed normal LVEF with ascending aortic dilation 4.1 cm, without evidence of MI, scar.    Patient reports decrease in exercise in recent years.  He plans to return to more regular exercise.  He has had limited palpitations, have not been bothering him recently with known history of PVCs.   Monitors with [employer] device.  Has had stable intermittent lower extremity edema, worse with increased sodium intake and throughout the day.  Improves in the morning.    He does have concerns regarding myalgias on rosuvastatin.  Has discontinued medic patient for short periods multiple times in the last year with improvement.  Currently taking rosuvastatin every other day but still feeling poorly.  Had taken atorvastatin for many years and may have had myalgias at that time as well.  He previously controlled cholesterol very well with diet and exercise.  Continues to have healthy diet.     He denies shortness of breath, dyspnea on exertion, and chest pain.        Cardiac MRI 10/2022:  1.  Normal left ventricular size, wall thickness and systolic function. The quantified left ventricular  ejection fraction is 63%.  2.  Normal right ventricular size and systolic function.  The quantified right ventricular ejection  fraction is 56%.  3.  No evidence of prior myocardial infarction, focal nonvascular myocardial fibrosis, or infiltrative  disease.  4.  No significant valvular abnormalities.  5.  Mildly dilated ascending aorta measuring 4.1 cm.    Coronary angiogram 9/2022:  Ost LM to Mid LM lesion is 25% stenosed.  Mid LM to Dist LM lesion is 25% stenosed.  2nd Diag lesion is 50% stenosed.  Mid LAD lesion is 30% stenosed.  1st Mrg lesion is 40% stenosed.  Prox RCA lesion is 25% stenosed.  RPDA lesion is 30% stenosed.  Dist RCA lesion is 30% stenosed.    Holter monitoring from 8/11/2022 to 8/12/2022 (duration 24hrs). Predominant underlying rhythm was sinus rhythm, 50 to 101bpm, average 74bpm. No nonsustained or sustained tachyarrhythmias. No atrial fibrillation. There were no pauses of greater than 3 seconds. Rare supraventricular ectopic beats (<1%). Frequent premature ventricular contractions (8%). Symptom triggers (2, irregular pulse) correlated to sinus rhythm with PVCs and PACs.     Physical Examination  Review of  "Systems   Vitals: /68 (BP Location: Left arm, Patient Position: Sitting, Cuff Size: Adult Regular)   Pulse 75   Resp 16   Ht 1.778 m (5' 10\")   Wt 87.1 kg (192 lb)   BMI 27.55 kg/m    BMI= Body mass index is 27.55 kg/m .  Wt Readings from Last 3 Encounters:   23 87.1 kg (192 lb)   22 85.9 kg (189 lb 6.4 oz)   22 88.5 kg (195 lb)           ENT/Mouth: membranes moist, no oral lesions or bleeding gums.      EYES:  no scleral icterus, normal conjunctivae                    Neck: No carotid bruit or thyromegaly   Chest/Lungs:   lungs are clear to auscultation, no rales or wheezing, , equal chest wall expansion    Cardiovascular:   Regular. Normal first and second heart sounds with soft 1/6 systolic murmur, rubs, or gallops; the carotid, radial and posterior tibial pulses are intact, absent edema bilaterally        Extremities: no cyanosis or clubbing   Skin: no xanthelasma, warm.    Neurologic: no tremors     Psychiatric: alert and oriented x3, calm        Please refer above for cardiac ROS details.        Medical History  Surgical History Family History Social History   No past medical history on file.  Past Surgical History:   Procedure Laterality Date    CV CORONARY ANGIOGRAM N/A 2022    Procedure: Coronary Angiogram;  Surgeon: Santosh Dougherty MD;  Location: [facility] CATH LAB CV     No family history on file.     Social History     Socioeconomic History    Marital status:      Spouse name: Not on file    Number of children: Not on file    Years of education: Not on file    Highest education level: Not on file   Occupational History    Not on file   Tobacco Use    Smoking status: Former     Types: Cigarettes     Quit date:      Years since quittin.0    Smokeless tobacco: Never   Substance and Sexual Activity    Alcohol use: Not on file    Drug use: Not on file    Sexual activity: Not on file   Other Topics Concern    Not on file   Social History Narrative    Not on " "file     Social Determinants of Health     Financial Resource Strain: Not on file   Food Insecurity: Not on file   Transportation Needs: Not on file   Physical Activity: Not on file   Stress: Not on file   Social Connections: Not on file   Interpersonal Safety: Not on file   Housing Stability: Not on file           Medications  Allergies   Current Outpatient Medications   Medication Sig Dispense Refill    rizatriptan (MAXALT) 10 MG tablet Take 10 mg by mouth at onset of headache for migraine      rosuvastatin (CRESTOR) 10 MG tablet Take 10 mg by mouth At Bedtime      therapeutic multivitamin (THERAGRAN) tablet [THERAPEUTIC MULTIVITAMIN (THERAGRAN) TABLET] Take 1 tablet by mouth daily.      albuterol (PROAIR HFA/PROVENTIL HFA/VENTOLIN HFA) 108 (90 Base) MCG/ACT inhaler Inhale 2 puffs into the lungs every 6 hours as needed (Patient not taking: Reported on 12/28/2023)      gabapentin (NEURONTIN) 100 MG capsule Take 100 mg by mouth as needed (Patient not taking: Reported on 12/28/2023)         Allergies   Allergen Reactions    Oyster Extract Anaphylaxis    Cephalosporins Unknown    Penicillins Unknown    Sulfa (Sulfonamide Antibiotics) [Sulfa Antibiotics] Unknown          Lab Results    Chemistry/lipid CBC Cardiac Enzymes/BNP/TSH/INR   Recent Labs   Lab Test 12/14/23  0800   CHOL 137   HDL 45   LDL 78   TRIG 71     Recent Labs   Lab Test 12/14/23  0800 12/15/22  0822 11/12/21  0832   LDL 78 63 97     Recent Labs   Lab Test 08/21/23  0831      POTASSIUM 4.6   CHLORIDE 103   CO2 27   *   BUN 15.6   CR 0.70   GFRESTIMATED >90   LON 9.3     Recent Labs   Lab Test 08/21/23  0831 12/15/22  0822 09/07/22  1006   CR 0.70 0.68 0.78     No results for input(s): \"A1C\" in the last 30250 hours.       Recent Labs   Lab Test 09/07/22  1006   WBC 6.4   HGB 16.2   HCT 48.2   MCV 91        Recent Labs   Lab Test 09/07/22  1006   HGB 16.2    No results for input(s): \"TROPONINI\" in the last 29101 hours.  No results for " "input(s): \"BNP\", \"NTBNPI\", \"NTBNP\" in the last 77305 hours.  Recent Labs   Lab Test 08/03/22  1627   TSH 2.10     No results for input(s): \"INR\" in the last 66959 hours.     Gabby Lagunas PA-C      Thank you for allowing me to participate in the care of your patient.      Sincerely,     Gabby Barclay PA-C     [facility]  cc:   Gabby Lagunas PA-C  [address]  [address]  [place] [address]      "

## 2024-01-03 ENCOUNTER — HOSPITAL ENCOUNTER (OUTPATIENT)
Dept: CARDIOLOGY | Facility: CLINIC | Age: 74
Discharge: HOME | End: 2024-01-03
Payer: COMMERCIAL

## 2024-01-03 DIAGNOSIS — I49.3 PVC'S (PREMATURE VENTRICULAR CONTRACTIONS): ICD-10-CM

## 2024-01-03 PROCEDURE — 93225 XTRNL ECG REC<48 HRS REC: CPT

## 2024-01-03 PROCEDURE — 93306 TTE W/DOPPLER COMPLETE: CPT

## 2024-01-03 PROCEDURE — 93306 TTE W/DOPPLER COMPLETE: CPT | Mod: 26 | Performed by: INTERNAL MEDICINE

## 2024-01-08 PROCEDURE — 93227 XTRNL ECG REC<48 HR R&I: CPT | Performed by: INTERNAL MEDICINE

## 2024-01-12 DIAGNOSIS — I77.810 ASCENDING AORTA DILATION (H): Primary | ICD-10-CM

## 2024-01-26 ENCOUNTER — HOSPITAL ENCOUNTER (OUTPATIENT)
Dept: MRI IMAGING | Facility: HOSPITAL | Age: 74
Discharge: HOME | End: 2024-01-26
Attending: INTERNAL MEDICINE
Payer: COMMERCIAL

## 2024-01-26 DIAGNOSIS — I77.810 ASCENDING AORTA DILATION (H): ICD-10-CM

## 2024-01-26 PROCEDURE — 255N000002 HC RX 255 OP 636: Performed by: INTERNAL MEDICINE

## 2024-01-26 PROCEDURE — A9585 GADOBUTROL INJECTION: HCPCS | Performed by: INTERNAL MEDICINE

## 2024-01-26 PROCEDURE — 71552 MRI CHEST W/O & W/DYE: CPT

## 2024-01-26 PROCEDURE — 71555 MRI ANGIO CHEST W OR W/O DYE: CPT

## 2024-01-26 RX ORDER — GADOBUTROL 604.72 MG/ML
9 INJECTION INTRAVENOUS ONCE
Status: COMPLETED | OUTPATIENT
Start: 2024-01-26 | End: 2024-01-26

## 2024-01-26 RX ADMIN — GADOBUTROL 9 ML: 604.72 INJECTION INTRAVENOUS at 16:52
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
## 2024-03-04 ENCOUNTER — OFFICE VISIT (OUTPATIENT)
Dept: NEUROLOGY | Facility: CLINIC | Age: 74
End: 2024-03-04
Attending: FAMILY MEDICINE
Payer: COMMERCIAL

## 2024-03-04 VITALS
WEIGHT: 192 LBS | HEART RATE: 77 BPM | SYSTOLIC BLOOD PRESSURE: 122 MMHG | HEIGHT: 70 IN | BODY MASS INDEX: 27.49 KG/M2 | DIASTOLIC BLOOD PRESSURE: 70 MMHG

## 2024-03-04 DIAGNOSIS — G35 MULTIPLE SCLEROSIS (H): Primary | ICD-10-CM

## 2024-03-04 DIAGNOSIS — M43.12 SPONDYLOLISTHESIS OF CERVICAL REGION: ICD-10-CM

## 2024-03-04 DIAGNOSIS — G43.009 MIGRAINE WITHOUT AURA AND WITHOUT STATUS MIGRAINOSUS, NOT INTRACTABLE: ICD-10-CM

## 2024-03-04 PROCEDURE — 99205 OFFICE O/P NEW HI 60 MIN: CPT | Performed by: PSYCHIATRY & NEUROLOGY

## 2024-03-04 RX ORDER — ROSUVASTATIN CALCIUM 10 MG/1
10 TABLET, COATED ORAL EVERY OTHER DAY
COMMUNITY
Start: 2023-11-01 | End: 2024-07-24

## 2024-03-04 RX ORDER — BACLOFEN 10 MG/1
10 TABLET ORAL AT BEDTIME
Qty: 30 TABLET | Refills: 11 | Status: SHIPPED | OUTPATIENT
Start: 2024-03-04 | End: 2024-06-19

## 2024-03-04 RX ORDER — LORATADINE 10 MG/1
10 TABLET ORAL DAILY
COMMUNITY

## 2024-03-04 NOTE — LETTER
April 2, 2024      Alex Jiang  [address] [place] [id number]        Dear Apolinar,    We are writing to inform you of your test results.      Laboratory data 3/25/2024  B12 367  B12 level looks good continue as planned  Keep follow-up visit 6/19/2024    If you have any questions or concerns, please call the clinic at the number listed above.       Sincerely,    Dr. Booker Gaona

## 2024-03-04 NOTE — NURSING NOTE
Chief Complaint   Patient presents with    Neck Pain     Pt states neck muscles feel tight. Pain will radiate up into ear. Pt wondering next steps to help with neck pain. He is taking Crestor, had to decrease to 10mg every other day due to muscle aches.    Migraine     Has had migraines since 1980. Has had a migraine 4 of the past 5 days.     Aury Acevedo LPN on 3/4/2024 at 2:17 PM

## 2024-03-25 ENCOUNTER — LAB REQUISITION (OUTPATIENT)
Dept: LAB | Facility: CLINIC | Age: 74
End: 2024-03-25
Payer: COMMERCIAL

## 2024-03-25 ENCOUNTER — TRANSFERRED RECORDS (OUTPATIENT)
Dept: HEALTH INFORMATION MANAGEMENT | Facility: CLINIC | Age: 74
End: 2024-03-25

## 2024-03-25 DIAGNOSIS — E78.5 HYPERLIPIDEMIA, UNSPECIFIED: ICD-10-CM

## 2024-03-25 LAB
CHOLEST SERPL-MCNC: 161 MG/DL
FASTING STATUS PATIENT QL REPORTED: NORMAL
HDLC SERPL-MCNC: 45 MG/DL
LDLC SERPL CALC-MCNC: 95 MG/DL
NONHDLC SERPL-MCNC: 116 MG/DL
TRIGL SERPL-MCNC: 107 MG/DL
VIT B12 SERPL-MCNC: 367 PG/ML (ref 232–1245)

## 2024-03-25 PROCEDURE — 82607 VITAMIN B-12: CPT | Mod: ORL | Performed by: FAMILY MEDICINE

## 2024-03-25 PROCEDURE — 80061 LIPID PANEL: CPT | Mod: ORL | Performed by: FAMILY MEDICINE
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
## 2024-06-19 ENCOUNTER — OFFICE VISIT (OUTPATIENT)
Dept: NEUROLOGY | Facility: CLINIC | Age: 74
End: 2024-06-19
Payer: COMMERCIAL

## 2024-06-19 VITALS
DIASTOLIC BLOOD PRESSURE: 61 MMHG | HEART RATE: 72 BPM | WEIGHT: 192 LBS | HEIGHT: 70 IN | BODY MASS INDEX: 27.49 KG/M2 | SYSTOLIC BLOOD PRESSURE: 100 MMHG

## 2024-06-19 DIAGNOSIS — G43.009 MIGRAINE WITHOUT AURA AND WITHOUT STATUS MIGRAINOSUS, NOT INTRACTABLE: ICD-10-CM

## 2024-06-19 DIAGNOSIS — M43.12 SPONDYLOLISTHESIS OF CERVICAL REGION: ICD-10-CM

## 2024-06-19 DIAGNOSIS — G35 MULTIPLE SCLEROSIS (H): Primary | ICD-10-CM

## 2024-06-19 PROCEDURE — 99214 OFFICE O/P EST MOD 30 MIN: CPT | Performed by: PSYCHIATRY & NEUROLOGY

## 2024-06-19 PROCEDURE — G2211 COMPLEX E/M VISIT ADD ON: HCPCS | Performed by: PSYCHIATRY & NEUROLOGY

## 2024-06-19 RX ORDER — RIZATRIPTAN BENZOATE 10 MG/1
10 TABLET ORAL
Qty: 9 TABLET | Refills: 11 | Status: SHIPPED | OUTPATIENT
Start: 2024-06-19

## 2024-06-19 RX ORDER — BACLOFEN 10 MG/1
10 TABLET ORAL AT BEDTIME
Qty: 30 TABLET | Refills: 11 | Status: SHIPPED | OUTPATIENT
Start: 2024-06-19
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
## 2024-06-19 NOTE — NURSING NOTE
Chief Complaint   Patient presents with    MS     3 month follow up      Migraine     Pt feels migraines are well controlled. Needs refill of rizatriptan.    Neck Pain     Baclofen has helped tremendously. He is sleeping through the night now. He takes about 4 times per week.       Aury Acevedo LPN on 6/19/2024 at 1:17 PM

## 2024-06-20 ENCOUNTER — TRANSFERRED RECORDS (OUTPATIENT)
Dept: HEALTH INFORMATION MANAGEMENT | Facility: CLINIC | Age: 74
End: 2024-06-20
Payer: COMMERCIAL

## 2024-07-24 ENCOUNTER — OFFICE VISIT (OUTPATIENT)
Dept: CARDIOLOGY | Facility: CLINIC | Age: 74
End: 2024-07-24
Payer: COMMERCIAL

## 2024-07-24 VITALS
WEIGHT: 192.3 LBS | HEART RATE: 77 BPM | BODY MASS INDEX: 27.59 KG/M2 | RESPIRATION RATE: 20 BRPM | DIASTOLIC BLOOD PRESSURE: 70 MMHG | OXYGEN SATURATION: 90 % | SYSTOLIC BLOOD PRESSURE: 130 MMHG

## 2024-07-24 DIAGNOSIS — I25.10 CORONARY ARTERY DISEASE INVOLVING NATIVE CORONARY ARTERY OF NATIVE HEART WITHOUT ANGINA PECTORIS: ICD-10-CM

## 2024-07-24 DIAGNOSIS — I49.3 PVC'S (PREMATURE VENTRICULAR CONTRACTIONS): ICD-10-CM

## 2024-07-24 PROBLEM — I77.89 ASCENDING AORTA ENLARGEMENT (H): Status: ACTIVE | Noted: 2023-12-28

## 2024-07-24 PROCEDURE — 99215 OFFICE O/P EST HI 40 MIN: CPT | Performed by: INTERNAL MEDICINE

## 2024-07-24 RX ORDER — EZETIMIBE 10 MG/1
10 TABLET ORAL DAILY
Qty: 90 TABLET | Refills: 3 | Status: SHIPPED | OUTPATIENT
Start: 2024-07-24

## 2024-07-24 NOTE — PROGRESS NOTES
[facility]  [phone]          Assessment/Recommendations   Patient with known mild coronary artery disease, intolerance to statins, mild enlargement of the ascending aorta and PVCs.  He has been quite stable this past year.  Repeat imaging of his ascending aorta toward the beginning of this year showed stability with again mild enlargement of the ascending aorta.  Holter monitor showed 4% PVCs his echocardiogram showed normal left ventricular systolic function.    He has been less active and I have encouraged him to get back to her regular exercise routine which I think he will do.  He will follow a Mediterranean diet as he mostly does already and we talked about a low carbohydrate diet which the Mediterranean diet would fulfill.    He has been intolerant to statins that he had a very bizarre reaction to a PCSK9 inhibitor injection.  We will try him on Zetia 10 mg a day and he will check his fasting lipid panel with his primary care physician in 3 months and let us know.    We talked about aspirin and he has taken aspirin in the past without any bleeding so his risk of intracerebral bleeding is very low.  He will reconsider whether or not to take a baby aspirin each day.    Thank you for allowing us to participate in his care.    40 minutes spent with chart review, patient visit, documentation, and .       History of Present Illness/Subjective    Mr. Alex Jiang is a 74 year old male with known history of PVCs, hyperlipidemia, mild coronary artery disease, and intolerance to statins.  He gets very achy when he takes statins and he cannot do regular activities such as mowing the lawn without a fair amount of pain.  He denies any chest pain, orthopnea, palpitations, syncope, paroxysmal nocturnal dyspnea and does get peripheral edema which she has had for years and is unchanged.  He developed a unusual reaction to Repatha with hallucinations, a very unusual feeling as if he was  in a barrel and this is gradually gone away and he is almost back to normal after taking this medication 1 time several days ago.  He has never taken Zetia.         Physical Examination Review of Systems   /70 (BP Location: Left arm, Patient Position: Sitting, Cuff Size: Adult Regular)   Pulse 77   Resp 20   Wt 87.2 kg (192 lb 4.8 oz)   SpO2 90%   BMI 27.59 kg/m    Body mass index is 27.59 kg/m .  Wt Readings from Last 3 Encounters:   07/24/24 87.2 kg (192 lb 4.8 oz)   06/19/24 87.1 kg (192 lb)   03/04/24 87.1 kg (192 lb)     General Appearance:   Alert, cooperative and in no acute distress.   ENT/Mouth: Pink/moist oral mucosa   EYES:  no scleral icterus, normal conjunctivae   Neck: JVP normal. No Hepatojugular reflux. Thyroid not visualized.   Chest/Lungs:   Lungs are clear to auscultation, equal chest wall expansion.   Cardiovascular:   S1, S2 without murmur ,clicks or rubs. Brachial, radial and posterior tibial pulses are intact and symetric. No carotid bruits noted   Abdomen:  Nontender.    Extremities: No cyanosis, clubbing and mild bilateral pretibial edema   Skin: no xanthelasma, warm.    Neurologic: normal arm movement bilateral, no tremors     Psychiatric: Appropriate affect.      Enc Vitals  BP: 130/70  Pulse: 77  Resp: 20  SpO2: 90 %  Weight: 87.2 kg (192 lb 4.8 oz)                                           Medical History  Surgical History Family History Social History   No past medical history on file. Past Surgical History:   Procedure Laterality Date    CV CORONARY ANGIOGRAM N/A 9/7/2022    Procedure: Coronary Angiogram;  Surgeon: Santosh Dougherty MD;  Location: [facility] CATH LAB CV    No family history on file. Social History     Socioeconomic History    Marital status:      Spouse name: Not on file    Number of children: Not on file    Years of education: Not on file    Highest education level: Not on file   Occupational History    Not on file   Tobacco Use    Smoking status:  Former     Current packs/day: 0.00     Types: Cigarettes     Quit date:      Years since quittin.5    Smokeless tobacco: Never   Substance and Sexual Activity    Alcohol use: Yes     Comment: 3 times per week    Drug use: Not on file    Sexual activity: Not on file   Other Topics Concern    Not on file   Social History Narrative    Not on file     Social Determinants of Health     Financial Resource Strain: Not on file   Food Insecurity: Not on file   Transportation Needs: Not on file   Physical Activity: Not on file   Stress: Not on file   Social Connections: Not on file   Interpersonal Safety: Not on file   Housing Stability: Not on file          Medications  Allergies   Current Outpatient Medications   Medication Sig Dispense Refill    baclofen (LIORESAL) 10 MG tablet Take 1 tablet (10 mg) by mouth at bedtime 30 tablet 11    ezetimibe (ZETIA) 10 MG tablet Take 1 tablet (10 mg) by mouth daily 90 tablet 3    loratadine (CLARITIN) 10 MG tablet Take 10 mg by mouth daily      rizatriptan (MAXALT) 10 MG tablet Take 1 tablet (10 mg) by mouth at onset of headache for migraine 9 tablet 11    therapeutic multivitamin (THERAGRAN) tablet [THERAPEUTIC MULTIVITAMIN (THERAGRAN) TABLET] Take 1 tablet by mouth daily.      Allergies   Allergen Reactions    Oyster Extract Anaphylaxis    Cephalosporins Unknown    Penicillins Unknown    Praluent [Alirocumab] Other (See Comments)     Took once - 36 hr felt like going over [place] and halluncinations    Sulfa (Sulfonamide Antibiotics) [Sulfa Antibiotics] Unknown         Lab Results    Chemistry/lipid CBC Cardiac Enzymes/BNP/TSH/INR   Lab Results   Component Value Date    CHOL 161 2024    HDL 45 2024    TRIG 107 2024    BUN 15.6 2023     2023    CO2 27 2023    Lab Results   Component Value Date    WBC 6.4 2022    HGB 16.2 2022    HCT 48.2 2022    MCV 91 2022     2022    Lab Results   Component  Value Date    TSH 2.10 08/03/2022

## 2024-07-24 NOTE — LETTER
7/24/2024    Richar Flor MD  [address] [place] [address]    RE: Alex Jiang       Dear Colleague,     I had the pleasure of seeing Alex Jiang in the [facility].      [facility]  [phone]          Assessment/Recommendations   Patient with known mild coronary artery disease, intolerance to statins, mild enlargement of the ascending aorta and PVCs.  He has been quite stable this past year.  Repeat imaging of his ascending aorta toward the beginning of this year showed stability with again mild enlargement of the ascending aorta.  Holter monitor showed 4% PVCs his echocardiogram showed normal left ventricular systolic function.    He has been less active and I have encouraged him to get back to her regular exercise routine which I think he will do.  He will follow a Mediterranean diet as he mostly does already and we talked about a low carbohydrate diet which the Mediterranean diet would fulfill.    He has been intolerant to statins that he had a very bizarre reaction to a PCSK9 inhibitor injection.  We will try him on Zetia 10 mg a day and he will check his fasting lipid panel with his primary care physician in 3 months and let us know.    We talked about aspirin and he has taken aspirin in the past without any bleeding so his risk of intracerebral bleeding is very low.  He will reconsider whether or not to take a baby aspirin each day.    Thank you for allowing us to participate in his care.    40 minutes spent with chart review, patient visit, documentation, and .       History of Present Illness/Subjective    Mr. Alex Jiang is a 74 year old male with known history of PVCs, hyperlipidemia, mild coronary artery disease, and intolerance to statins.  He gets very achy when he takes statins and he cannot do regular activities such as mowing the lawn without a fair amount of pain.  He denies any chest pain, orthopnea, palpitations, syncope,  paroxysmal nocturnal dyspnea and does get peripheral edema which she has had for years and is unchanged.  He developed a unusual reaction to Repatha with hallucinations, a very unusual feeling as if he was in a barrel and this is gradually gone away and he is almost back to normal after taking this medication 1 time several days ago.  He has never taken Zetia.         Physical Examination Review of Systems   /70 (BP Location: Left arm, Patient Position: Sitting, Cuff Size: Adult Regular)   Pulse 77   Resp 20   Wt 87.2 kg (192 lb 4.8 oz)   SpO2 90%   BMI 27.59 kg/m    Body mass index is 27.59 kg/m .  Wt Readings from Last 3 Encounters:   07/24/24 87.2 kg (192 lb 4.8 oz)   06/19/24 87.1 kg (192 lb)   03/04/24 87.1 kg (192 lb)     General Appearance:   Alert, cooperative and in no acute distress.   ENT/Mouth: Pink/moist oral mucosa   EYES:  no scleral icterus, normal conjunctivae   Neck: JVP normal. No Hepatojugular reflux. Thyroid not visualized.   Chest/Lungs:   Lungs are clear to auscultation, equal chest wall expansion.   Cardiovascular:   S1, S2 without murmur ,clicks or rubs. Brachial, radial and posterior tibial pulses are intact and symetric. No carotid bruits noted   Abdomen:  Nontender.    Extremities: No cyanosis, clubbing and mild bilateral pretibial edema   Skin: no xanthelasma, warm.    Neurologic: normal arm movement bilateral, no tremors     Psychiatric: Appropriate affect.      Enc Vitals  BP: 130/70  Pulse: 77  Resp: 20  SpO2: 90 %  Weight: 87.2 kg (192 lb 4.8 oz)                                           Medical History  Surgical History Family History Social History   No past medical history on file. Past Surgical History:   Procedure Laterality Date    CV CORONARY ANGIOGRAM N/A 9/7/2022    Procedure: Coronary Angiogram;  Surgeon: Santosh Dougherty MD;  [facility] CATH LAB CV    No family history on file. Social History     Socioeconomic History    Marital status:       Spouse name: Not on file    Number of children: Not on file    Years of education: Not on file    Highest education level: Not on file   Occupational History    Not on file   Tobacco Use    Smoking status: Former     Current packs/day: 0.00     Types: Cigarettes     Quit date:      Years since quittin.5    Smokeless tobacco: Never   Substance and Sexual Activity    Alcohol use: Yes     Comment: 3 times per week    Drug use: Not on file    Sexual activity: Not on file   Other Topics Concern    Not on file   Social History Narrative    Not on file     Social Determinants of Health     Financial Resource Strain: Not on file   Food Insecurity: Not on file   Transportation Needs: Not on file   Physical Activity: Not on file   Stress: Not on file   Social Connections: Not on file   Interpersonal Safety: Not on file   Housing Stability: Not on file          Medications  Allergies   Current Outpatient Medications   Medication Sig Dispense Refill    baclofen (LIORESAL) 10 MG tablet Take 1 tablet (10 mg) by mouth at bedtime 30 tablet 11    ezetimibe (ZETIA) 10 MG tablet Take 1 tablet (10 mg) by mouth daily 90 tablet 3    loratadine (CLARITIN) 10 MG tablet Take 10 mg by mouth daily      rizatriptan (MAXALT) 10 MG tablet Take 1 tablet (10 mg) by mouth at onset of headache for migraine 9 tablet 11    therapeutic multivitamin (THERAGRAN) tablet [THERAPEUTIC MULTIVITAMIN (THERAGRAN) TABLET] Take 1 tablet by mouth daily.      Allergies   Allergen Reactions    Oyster Extract Anaphylaxis    Cephalosporins Unknown    Penicillins Unknown    Praluent [Alirocumab] Other (See Comments)     Took once - 36 hr felt like going over [place] and halluncinations    Sulfa (Sulfonamide Antibiotics) [Sulfa Antibiotics] Unknown         Lab Results    Chemistry/lipid CBC Cardiac Enzymes/BNP/TSH/INR   Lab Results   Component Value Date    CHOL 161 2024    HDL 45 2024    TRIG 107 2024    BUN 15.6 2023      08/21/2023    CO2 27 08/21/2023    Lab Results   Component Value Date    WBC 6.4 09/07/2022    HGB 16.2 09/07/2022    HCT 48.2 09/07/2022    MCV 91 09/07/2022     09/07/2022    Lab Results   Component Value Date    TSH 2.10 08/03/2022              Thank you for allowing me to participate in the care of your patient.      Sincerely,     Booker Leon MD     [facility]  cc:   Gabby Lagunas PA-C  [address]  [address]  [place] [address]

## 2024-11-14 ENCOUNTER — LAB REQUISITION (OUTPATIENT)
Dept: LAB | Facility: CLINIC | Age: 74
End: 2024-11-14
Payer: COMMERCIAL

## 2024-11-14 DIAGNOSIS — Z12.5 ENCOUNTER FOR SCREENING FOR MALIGNANT NEOPLASM OF PROSTATE: ICD-10-CM

## 2024-11-14 DIAGNOSIS — E78.5 HYPERLIPIDEMIA, UNSPECIFIED: ICD-10-CM

## 2024-11-14 LAB
ANION GAP SERPL CALCULATED.3IONS-SCNC: 12 MMOL/L (ref 7–15)
BUN SERPL-MCNC: 14.4 MG/DL (ref 8–23)
CALCIUM SERPL-MCNC: 9.3 MG/DL (ref 8.8–10.4)
CHLORIDE SERPL-SCNC: 104 MMOL/L (ref 98–107)
CHOLEST SERPL-MCNC: 178 MG/DL
CREAT SERPL-MCNC: 0.72 MG/DL (ref 0.67–1.17)
EGFRCR SERPLBLD CKD-EPI 2021: >90 ML/MIN/1.73M2
FASTING STATUS PATIENT QL REPORTED: ABNORMAL
FASTING STATUS PATIENT QL REPORTED: ABNORMAL
GLUCOSE SERPL-MCNC: 108 MG/DL (ref 70–99)
HCO3 SERPL-SCNC: 24 MMOL/L (ref 22–29)
HDLC SERPL-MCNC: 49 MG/DL
LDLC SERPL CALC-MCNC: 115 MG/DL
NONHDLC SERPL-MCNC: 129 MG/DL
POTASSIUM SERPL-SCNC: 4.5 MMOL/L (ref 3.4–5.3)
PSA SERPL DL<=0.01 NG/ML-MCNC: 0.6 NG/ML (ref 0–6.5)
SODIUM SERPL-SCNC: 140 MMOL/L (ref 135–145)
TRIGL SERPL-MCNC: 72 MG/DL

## 2024-11-14 PROCEDURE — G0103 PSA SCREENING: HCPCS | Mod: ORL | Performed by: FAMILY MEDICINE

## 2024-11-14 PROCEDURE — 80048 BASIC METABOLIC PNL TOTAL CA: CPT | Mod: ORL | Performed by: FAMILY MEDICINE

## 2024-11-14 PROCEDURE — 80061 LIPID PANEL: CPT | Mod: ORL | Performed by: FAMILY MEDICINE

## 2024-12-08 ENCOUNTER — HEALTH MAINTENANCE LETTER (OUTPATIENT)
Age: 74
End: 2024-12-08

## 2025-02-27 ENCOUNTER — LAB REQUISITION (OUTPATIENT)
Dept: LAB | Facility: CLINIC | Age: 75
End: 2025-02-27
Payer: COMMERCIAL

## 2025-02-27 DIAGNOSIS — R73.9 HYPERGLYCEMIA, UNSPECIFIED: ICD-10-CM

## 2025-02-27 DIAGNOSIS — E78.5 HYPERLIPIDEMIA, UNSPECIFIED: ICD-10-CM

## 2025-02-27 LAB
CHOLEST SERPL-MCNC: 143 MG/DL
EST. AVERAGE GLUCOSE BLD GHB EST-MCNC: 123 MG/DL
FASTING STATUS PATIENT QL REPORTED: YES
HBA1C MFR BLD: 5.9 %
HDLC SERPL-MCNC: 46 MG/DL
LDLC SERPL CALC-MCNC: 82 MG/DL
NONHDLC SERPL-MCNC: 97 MG/DL
TRIGL SERPL-MCNC: 74 MG/DL

## 2025-02-27 PROCEDURE — 83036 HEMOGLOBIN GLYCOSYLATED A1C: CPT | Mod: ORL | Performed by: FAMILY MEDICINE

## 2025-02-27 PROCEDURE — 80061 LIPID PANEL: CPT | Mod: ORL | Performed by: FAMILY MEDICINE
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
## 2025-06-16 ENCOUNTER — OFFICE VISIT (OUTPATIENT)
Dept: NEUROLOGY | Facility: CLINIC | Age: 75
End: 2025-06-16
Payer: COMMERCIAL

## 2025-06-16 VITALS
HEIGHT: 70 IN | DIASTOLIC BLOOD PRESSURE: 59 MMHG | HEART RATE: 56 BPM | BODY MASS INDEX: 26.48 KG/M2 | SYSTOLIC BLOOD PRESSURE: 99 MMHG | WEIGHT: 185 LBS

## 2025-06-16 DIAGNOSIS — G43.009 MIGRAINE WITHOUT AURA AND WITHOUT STATUS MIGRAINOSUS, NOT INTRACTABLE: ICD-10-CM

## 2025-06-16 DIAGNOSIS — M43.12 SPONDYLOLISTHESIS OF CERVICAL REGION: ICD-10-CM

## 2025-06-16 DIAGNOSIS — G35 MULTIPLE SCLEROSIS (H): Primary | ICD-10-CM

## 2025-06-16 PROCEDURE — 99214 OFFICE O/P EST MOD 30 MIN: CPT | Performed by: PSYCHIATRY & NEUROLOGY

## 2025-06-16 PROCEDURE — G2211 COMPLEX E/M VISIT ADD ON: HCPCS | Performed by: PSYCHIATRY & NEUROLOGY

## 2025-06-16 PROCEDURE — 3074F SYST BP LT 130 MM HG: CPT | Performed by: PSYCHIATRY & NEUROLOGY

## 2025-06-16 PROCEDURE — 3078F DIAST BP <80 MM HG: CPT | Performed by: PSYCHIATRY & NEUROLOGY

## 2025-06-16 RX ORDER — BACLOFEN 10 MG/1
10 TABLET ORAL AT BEDTIME
Qty: 30 TABLET | Refills: 11 | Status: SHIPPED | OUTPATIENT
Start: 2025-06-16

## 2025-06-16 RX ORDER — SUMATRIPTAN SUCCINATE 25 MG/1
TABLET ORAL
COMMUNITY
Start: 2025-03-03 | End: 2025-06-16

## 2025-06-16 RX ORDER — SUMATRIPTAN SUCCINATE 25 MG/1
25 TABLET ORAL
Qty: 9 TABLET | Refills: 11 | Status: SHIPPED | OUTPATIENT
Start: 2025-06-16

## 2025-06-16 RX ORDER — ATORVASTATIN CALCIUM 10 MG/1
10 TABLET, FILM COATED ORAL EVERY OTHER DAY
COMMUNITY
Start: 2025-05-11
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
## 2025-06-16 NOTE — NURSING NOTE
Chief Complaint   Patient presents with    MS     Annual follow up. Pt states he had a few months when he had several migraines. Has been using rizatriptan. He was prescribed topamax, but hasn't started yet as he doesn't feel he needs it.  He states he has not had any falls for about 4 months.      Aury Acevedo LPN on 6/16/2025 at 11:24 AM

## (undated) DEVICE — 0.035IN X 260CM INQWIRE DIAGNOSTIC GUIDEWIRE, FIXED-CORE PTFE COATED, 3MM J-TIP

## (undated) DEVICE — INTRO MICRO MINI STICK 4FR STD NITINOL

## (undated) DEVICE — KIT HAND CONTROL ACIST 016795

## (undated) DEVICE — CATH ANGIO INFINITI JL3.5 4FRX100CM 538418

## (undated) DEVICE — SHEATH GLIDE RADIAL 4FR 25CM 0.021

## (undated) DEVICE — GUIDEWIRE STARTER .035INX150CM

## (undated) DEVICE — MANIFOLD KIT ANGIO AUTOMATED 014613

## (undated) DEVICE — ELECTRODE ADULT PACING MULTI P-211-M1

## (undated) DEVICE — CATH DIAG 4FR JR 5.0 538423

## (undated) DEVICE — SYR ANGIOGRAPHY MULTIUSE KIT ACIST 014612

## (undated) DEVICE — SLEEVE TR BAND RADIAL COMPRESSION DEVICE 24CM TRB24-REG

## (undated) DEVICE — CUSTOM PACK CORONARY SAN5BCRHEA

## (undated) RX ORDER — FENTANYL CITRATE 50 UG/ML
INJECTION, SOLUTION INTRAMUSCULAR; INTRAVENOUS
Status: DISPENSED
Start: 2022-09-07

## (undated) RX ORDER — DIAZEPAM 5 MG
TABLET ORAL
Status: DISPENSED
Start: 2022-09-07